# Patient Record
Sex: MALE | Race: WHITE | NOT HISPANIC OR LATINO | Employment: FULL TIME | URBAN - METROPOLITAN AREA
[De-identification: names, ages, dates, MRNs, and addresses within clinical notes are randomized per-mention and may not be internally consistent; named-entity substitution may affect disease eponyms.]

---

## 2023-06-15 ENCOUNTER — OFFICE VISIT (OUTPATIENT)
Dept: PRIMARY CARE | Facility: CLINIC | Age: 58
End: 2023-06-15
Payer: COMMERCIAL

## 2023-06-15 ENCOUNTER — LAB (OUTPATIENT)
Dept: LAB | Facility: LAB | Age: 58
End: 2023-06-15
Payer: COMMERCIAL

## 2023-06-15 DIAGNOSIS — R10.11 COLICKY RUQ ABDOMINAL PAIN: ICD-10-CM

## 2023-06-15 DIAGNOSIS — E11.69 TYPE 2 DIABETES MELLITUS WITH OTHER SPECIFIED COMPLICATION, WITHOUT LONG-TERM CURRENT USE OF INSULIN (MULTI): ICD-10-CM

## 2023-06-15 DIAGNOSIS — R10.11 COLICKY RUQ ABDOMINAL PAIN: Primary | ICD-10-CM

## 2023-06-15 LAB
ALANINE AMINOTRANSFERASE (SGPT) (U/L) IN SER/PLAS: 23 U/L (ref 10–52)
ALBUMIN (G/DL) IN SER/PLAS: 4.3 G/DL (ref 3.4–5)
ALKALINE PHOSPHATASE (U/L) IN SER/PLAS: 85 U/L (ref 33–120)
ANION GAP IN SER/PLAS: 13 MMOL/L (ref 10–20)
ASPARTATE AMINOTRANSFERASE (SGOT) (U/L) IN SER/PLAS: 18 U/L (ref 9–39)
BILIRUBIN TOTAL (MG/DL) IN SER/PLAS: 0.8 MG/DL (ref 0–1.2)
CALCIUM (MG/DL) IN SER/PLAS: 9.5 MG/DL (ref 8.6–10.3)
CARBON DIOXIDE, TOTAL (MMOL/L) IN SER/PLAS: 26 MMOL/L (ref 21–32)
CHLORIDE (MMOL/L) IN SER/PLAS: 100 MMOL/L (ref 98–107)
CHOLESTEROL (MG/DL) IN SER/PLAS: 114 MG/DL (ref 0–199)
CHOLESTEROL IN HDL (MG/DL) IN SER/PLAS: 41.6 MG/DL
CHOLESTEROL/HDL RATIO: 2.7
CREATININE (MG/DL) IN SER/PLAS: 1 MG/DL (ref 0.5–1.3)
ERYTHROCYTE DISTRIBUTION WIDTH (RATIO) BY AUTOMATED COUNT: 12.5 % (ref 11.5–14.5)
ERYTHROCYTE MEAN CORPUSCULAR HEMOGLOBIN CONCENTRATION (G/DL) BY AUTOMATED: 32.7 G/DL (ref 32–36)
ERYTHROCYTE MEAN CORPUSCULAR VOLUME (FL) BY AUTOMATED COUNT: 88 FL (ref 80–100)
ERYTHROCYTES (10*6/UL) IN BLOOD BY AUTOMATED COUNT: 5.22 X10E12/L (ref 4.5–5.9)
GFR MALE: 87 ML/MIN/1.73M2
GLUCOSE (MG/DL) IN SER/PLAS: 259 MG/DL (ref 74–99)
HEMATOCRIT (%) IN BLOOD BY AUTOMATED COUNT: 45.9 % (ref 41–52)
HEMOGLOBIN (G/DL) IN BLOOD: 15 G/DL (ref 13.5–17.5)
LDL: 42 MG/DL (ref 0–99)
LEUKOCYTES (10*3/UL) IN BLOOD BY AUTOMATED COUNT: 7.8 X10E9/L (ref 4.4–11.3)
PLATELETS (10*3/UL) IN BLOOD AUTOMATED COUNT: 211 X10E9/L (ref 150–450)
POC HEMOGLOBIN A1C: 12.6 % (ref 4.2–6.5)
POTASSIUM (MMOL/L) IN SER/PLAS: 3.9 MMOL/L (ref 3.5–5.3)
PROTEIN TOTAL: 7.1 G/DL (ref 6.4–8.2)
SODIUM (MMOL/L) IN SER/PLAS: 135 MMOL/L (ref 136–145)
TRIGLYCERIDE (MG/DL) IN SER/PLAS: 152 MG/DL (ref 0–149)
UREA NITROGEN (MG/DL) IN SER/PLAS: 16 MG/DL (ref 6–23)
VLDL: 30 MG/DL (ref 0–40)

## 2023-06-15 PROCEDURE — 82570 ASSAY OF URINE CREATININE: CPT

## 2023-06-15 PROCEDURE — 4010F ACE/ARB THERAPY RXD/TAKEN: CPT | Performed by: NURSE PRACTITIONER

## 2023-06-15 PROCEDURE — 1036F TOBACCO NON-USER: CPT | Performed by: NURSE PRACTITIONER

## 2023-06-15 PROCEDURE — 82043 UR ALBUMIN QUANTITATIVE: CPT

## 2023-06-15 PROCEDURE — 85027 COMPLETE CBC AUTOMATED: CPT

## 2023-06-15 PROCEDURE — 99214 OFFICE O/P EST MOD 30 MIN: CPT | Performed by: NURSE PRACTITIONER

## 2023-06-15 PROCEDURE — 80053 COMPREHEN METABOLIC PANEL: CPT

## 2023-06-15 PROCEDURE — 36415 COLL VENOUS BLD VENIPUNCTURE: CPT

## 2023-06-15 PROCEDURE — 3075F SYST BP GE 130 - 139MM HG: CPT | Performed by: NURSE PRACTITIONER

## 2023-06-15 PROCEDURE — 83036 HEMOGLOBIN GLYCOSYLATED A1C: CPT | Performed by: NURSE PRACTITIONER

## 2023-06-15 PROCEDURE — 3078F DIAST BP <80 MM HG: CPT | Performed by: NURSE PRACTITIONER

## 2023-06-15 PROCEDURE — 80061 LIPID PANEL: CPT

## 2023-06-15 RX ORDER — ESCITALOPRAM OXALATE 10 MG/1
10 TABLET ORAL DAILY
COMMUNITY
End: 2024-01-22 | Stop reason: SDUPTHER

## 2023-06-15 RX ORDER — LISINOPRIL 5 MG/1
1 TABLET ORAL 2 TIMES DAILY
COMMUNITY
Start: 2022-03-10 | End: 2023-07-17 | Stop reason: DRUGHIGH

## 2023-06-15 RX ORDER — CARVEDILOL 3.12 MG/1
3.12 TABLET ORAL
COMMUNITY
End: 2023-12-18 | Stop reason: SDUPTHER

## 2023-06-15 RX ORDER — DAPAGLIFLOZIN 10 MG/1
10 TABLET, FILM COATED ORAL DAILY
Qty: 14 TABLET | Refills: 0 | COMMUNITY
Start: 2023-06-15 | End: 2023-07-17 | Stop reason: SDUPTHER

## 2023-06-15 RX ORDER — ROSUVASTATIN CALCIUM 40 MG/1
1 TABLET, COATED ORAL NIGHTLY
COMMUNITY
Start: 2022-03-10 | End: 2023-12-18 | Stop reason: SDUPTHER

## 2023-06-15 RX ORDER — NAPROXEN SODIUM 220 MG/1
81 TABLET, FILM COATED ORAL ONCE
COMMUNITY
Start: 2023-05-23 | End: 2023-12-18 | Stop reason: SDUPTHER

## 2023-06-15 RX ORDER — DAPAGLIFLOZIN 10 MG/1
10 TABLET, FILM COATED ORAL DAILY
Qty: 30 TABLET | Refills: 11 | Status: SHIPPED | OUTPATIENT
Start: 2023-06-15 | End: 2023-07-17 | Stop reason: DRUGHIGH

## 2023-06-15 RX ORDER — METFORMIN HYDROCHLORIDE 500 MG/1
1000 TABLET, EXTENDED RELEASE ORAL 2 TIMES DAILY
COMMUNITY
End: 2023-09-18 | Stop reason: SDUPTHER

## 2023-06-15 NOTE — PROGRESS NOTES
"Subjective   Patient ID: Julian Nash is a 57 y.o. male who presents for Follow-up (Patient in today for routine F/U and A1C check States that in Jan. He had an incident at work cruising his foot. Patient's A1C was 12.6%. ).    Diabetes Mellitus  Patient presents for follow up of diabetes. Current symptoms include: none. Symptoms have waxed and waned. Patient denies nausea, polydipsia, and polyuria. Evaluation to date has included: hemoglobin A1C.  Home sugars: patient does not check sugars. Current treatment: none. Last dilated eye exam: 1 year.  He did have a crush injury to his foot earlier this year which required surgery he states since then he has not been watching his diet and admits that he has been eating candy and other things that he should not be eating.  He does not check his sugars at home  We have started him on Jardiance previously however it was not covered under his insurance and he could not afford the co-pay.  Following cardiology for his history of MI    He has been having colicky right upper quadrant pain.  He states the pain will sometimes worsen depending on the position that he is in.  He denies nausea vomiting, or diarrhea.  He denies heavy alcohol use.           Review of Systems   All other systems reviewed and are negative.      Objective   /88   Pulse 63   Resp 18   Ht 1.88 m (6' 2\")   Wt 122 kg (269 lb)   SpO2 97%   BMI 34.54 kg/m²     Physical Exam  Vitals and nursing note reviewed.   Constitutional:       General: He is not in acute distress.     Appearance: Normal appearance.   HENT:      Head: Normocephalic and atraumatic.      Right Ear: External ear normal.      Left Ear: External ear normal.      Nose: Nose normal.      Mouth/Throat:      Mouth: Mucous membranes are moist.      Pharynx: Oropharynx is clear.   Eyes:      Extraocular Movements: Extraocular movements intact.      Conjunctiva/sclera: Conjunctivae normal.      Pupils: Pupils are equal, round, and reactive " to light.   Neck:      Vascular: No carotid bruit.   Cardiovascular:      Rate and Rhythm: Normal rate and regular rhythm.      Pulses: Normal pulses.      Heart sounds: Normal heart sounds.   Pulmonary:      Effort: Pulmonary effort is normal.      Breath sounds: Normal breath sounds.   Abdominal:      General: Bowel sounds are normal.      Palpations: Abdomen is soft.      Tenderness: There is abdominal tenderness (Mild tenderness on palpation of right upper quadrant.  Negative Sorensen sign).   Musculoskeletal:      Cervical back: Normal range of motion.   Lymphadenopathy:      Cervical: No cervical adenopathy.   Skin:     General: Skin is warm and dry.   Neurological:      General: No focal deficit present.      Mental Status: He is alert and oriented to person, place, and time. Mental status is at baseline.   Psychiatric:         Mood and Affect: Mood normal.         Behavior: Behavior normal.         Thought Content: Thought content normal.         Judgment: Judgment normal.         Assessment/Plan   Problem List Items Addressed This Visit       Diabetes mellitus (CMS/[facility])     -  in office A1C 12.6   Last A1C 9.7  -  in office /78  -  continue metformin ER 1000 mg twice a day  -Jardiance was not covered under his plan  - Start Farxiga 10 mg daily-discussed with going online to finding [employer] coupon to help with co-pay  - continue diet changes of lower carb intake and increased protein and vegetable intake  -Discussed to stop eating candy  -  currently taking rosuvastatin-  lipid panel ordered  -Current eye exam  -CBC, CMP, lipid panel, microalbumin  - increase activity to help with weight loss  - follow up 3 months         Relevant Medications    dapagliflozin (Farxiga) 10 mg    dapagliflozin (Farxiga) 10 mg    Other Relevant Orders    POCT glycosylated hemoglobin (Hb A1C) manually resulted (Completed)    CBC (Completed)    Comprehensive Metabolic Panel (Completed)    Lipid Panel (Completed)     Colicky RUQ abdominal pain - Primary     CBC, CMP  Ultrasound right upper quadrant  Red flags discussed         Relevant Orders    CBC (Completed)    Comprehensive Metabolic Panel (Completed)    Lipid Panel (Completed)    Albumin , Urine Random (Completed)

## 2023-06-15 NOTE — PATIENT INSTRUCTIONS
Google farxiga coupon card - will give you a website to complete the copay card if expensive    Take farxiga once a day in the morning    Follow up in 3 months

## 2023-06-16 VITALS
SYSTOLIC BLOOD PRESSURE: 138 MMHG | RESPIRATION RATE: 18 BRPM | HEART RATE: 63 BPM | WEIGHT: 269 LBS | HEIGHT: 74 IN | BODY MASS INDEX: 34.52 KG/M2 | DIASTOLIC BLOOD PRESSURE: 78 MMHG | OXYGEN SATURATION: 97 %

## 2023-06-16 PROBLEM — F41.9 ANXIETY: Status: ACTIVE | Noted: 2023-06-16

## 2023-06-16 PROBLEM — E11.9 DIABETES MELLITUS (MULTI): Status: ACTIVE | Noted: 2023-06-16

## 2023-06-16 PROBLEM — I10 HYPERTENSION: Status: ACTIVE | Noted: 2023-06-16

## 2023-06-16 PROBLEM — I21.9 MI (MYOCARDIAL INFARCTION) (MULTI): Status: RESOLVED | Noted: 2023-06-16 | Resolved: 2023-06-16

## 2023-06-16 PROBLEM — R10.11 COLICKY RUQ ABDOMINAL PAIN: Status: ACTIVE | Noted: 2023-06-16

## 2023-06-16 PROBLEM — E78.5 HLD (HYPERLIPIDEMIA): Status: ACTIVE | Noted: 2023-06-16

## 2023-06-16 LAB
ALBUMIN (MG/L) IN URINE: 43.8 MG/L
ALBUMIN/CREATININE (UG/MG) IN URINE: 28.8 UG/MG CRT (ref 0–30)
CREATININE (MG/DL) IN URINE: 152 MG/DL (ref 20–370)

## 2023-07-12 DIAGNOSIS — E11.9 TYPE 2 DIABETES MELLITUS WITHOUT COMPLICATION, WITHOUT LONG-TERM CURRENT USE OF INSULIN (MULTI): Primary | ICD-10-CM

## 2023-07-17 ENCOUNTER — TELEMEDICINE (OUTPATIENT)
Dept: PHARMACY | Facility: HOSPITAL | Age: 58
End: 2023-07-17
Payer: COMMERCIAL

## 2023-07-17 DIAGNOSIS — E11.9 TYPE 2 DIABETES MELLITUS WITHOUT COMPLICATION, WITHOUT LONG-TERM CURRENT USE OF INSULIN (MULTI): ICD-10-CM

## 2023-07-17 RX ORDER — LISINOPRIL 10 MG/1
10 TABLET ORAL DAILY
COMMUNITY
Start: 2023-07-11 | End: 2023-12-18 | Stop reason: SDUPTHER

## 2023-07-17 RX ORDER — DAPAGLIFLOZIN 5 MG/1
5 TABLET, FILM COATED ORAL DAILY
Qty: 30 TABLET | Refills: 2 | Status: SHIPPED | OUTPATIENT
Start: 2023-07-17 | End: 2023-09-18 | Stop reason: SDUPTHER

## 2023-07-17 ASSESSMENT — ENCOUNTER SYMPTOMS
BLURRED VISION: 1
FATIGUE: 1

## 2023-07-17 NOTE — PROGRESS NOTES
"Pharmacist Clinic: Diabetes Management  Julian Nash is a 57 y.o. male was referred to Clinical Pharmacy Team for diabetes management.   Referring Provider: BEN Harley-CNP     Diabetes  He presents for his initial diabetic visit. He has type 2 diabetes mellitus. His disease course has been worsening. Associated symptoms include blurred vision and fatigue. There are no diabetic complications. Risk factors for coronary artery disease include diabetes mellitus, dyslipidemia, hypertension, male sex and obesity. An ACE inhibitor/angiotensin II receptor blocker is being taken.   At recent PCP visit, Farxiga was started for uncontrolled diabetes. He was given a sample of Farxiga and stopped after taking a few doses due to perceived side effects of fatigue.   Not checking sugars at home. Unaware of symptoms of hyperglycemia.     HISTORY OF PRESENT ILLNESS  Approximate Date of Diagnosis: unknown  No known complications due to diabetes     Pertinent PMH Review:  - PMH of Urinary Tract Infections: no  - PMH MI 3/22  - States he was told he has fatty liver     Diet: Poor diet choices since quit smoking last year, increased appetite    LAB REVIEW   Glucose (mg/dL)   Date Value   06/15/2023 259 (H)   05/03/2021 220 (H)   02/01/2021 338 (H)     POC HEMOGLOBIN A1c (%)   Date Value   06/15/2023 12.6 (A)     Bicarbonate (mmol/L)   Date Value   06/15/2023 26   05/03/2021 27   02/01/2021 25     Urea Nitrogen (mg/dL)   Date Value   06/15/2023 16   05/03/2021 15   02/01/2021 17     Creatinine (mg/dL)   Date Value   06/15/2023 1.00   05/03/2021 0.97   02/01/2021 1.17     Lab Results   Component Value Date    HGBA1C 12.6 (A) 06/15/2023     Lab Results   Component Value Date    CHOL 114 06/15/2023    CHOL 130 08/04/2021    CHOL 144 05/03/2021     Lab Results   Component Value Date    HDL 41.6 06/15/2023    HDL 40.1 08/04/2021    HDL 34.8 (A) 05/03/2021     No results found for: \"LDLCALC\"  Lab Results   Component Value Date    " TRIG 152 (H) 06/15/2023    TRIG 94 08/04/2021    TRIG 186 (H) 05/03/2021       DIABETES ASSESSMENT    CURRENT PHARMACOTHERAPY  - Metformin XR 1000mg twice daily     Allergies: Patient has no known allergies.     SECONDARY PREVENTION  - Statin? Yes  - ACE-I/ARB? Yes  - Aspirin? Yes    HISTORICAL PHARMACOTHERAPY  - None    AFFORDABILITY/ADHERENCE   - Farxiga mfr savings card sent with prescription    Assessment/Plan   Problem List Items Addressed This Visit       Diabetes mellitus (CMS/[facility])    Relevant Medications    dapagliflozin propanediol (Farxiga) 5 mg    Other Relevant Orders    Follow Up In Clinical Pharmacy     Patients diabetes is uncontrolled with most recent A1c of 12.6% (Goal < 7%).   Initiate: Farxiga 5mg once daily. Starting at lower dose due to perceived side effects with 10mg dose. Plan to increase after 4 weeks pending tolerability. If copay is still high with savings card, consider [facility] PAP assessment.   Continue: Metformin  Discussed option of dietician consult, deferred for now    Education Provided to Patient:   Reviewed hyperglycemia symptoms to monitor: blurry vision, fatigue, increased urination  Farxiga Education:  - Counseled patient on Farxiga MOA, expectations, side effects, duration of therapy, administration, and monitoring parameters.  - Reviewed the benefits of SGLT-2i therapy, such as glycemic control and kidney and CV protection.  - Advised patient to practice proper  hygiene to reduce risk of UTIs or yeast infections.  - Advised patient to maintain adequate fluid intake to remain hydrated while on SGLT2i therapy.  - Answered all patient questions and concerns.      PharmD Follow-up: 8/17  PCP Follow-up: 9/18    Thank you,  Carmela Puckett, PharmD    Continue all meds under the continuation of care with the referring provider and clinical pharmacy team.  Verbal consent to manage patient's drug therapy was obtained. They were informed they may decline to participate or withdraw from  participation in pharmacy services at any time.

## 2023-07-17 NOTE — Clinical Note
He stopped taking Farxiga sample d/t perceived side effect (fatigue). Provided education. Sent 5mg rx and signed up for mfr savings, will re-assess in 4 weeks. Thanks!

## 2023-08-17 ENCOUNTER — APPOINTMENT (OUTPATIENT)
Dept: PHARMACY | Facility: HOSPITAL | Age: 58
End: 2023-08-17
Payer: COMMERCIAL

## 2023-08-21 ENCOUNTER — APPOINTMENT (OUTPATIENT)
Dept: PHARMACY | Facility: HOSPITAL | Age: 58
End: 2023-08-21
Payer: COMMERCIAL

## 2023-09-03 PROBLEM — F17.200 TOBACCO DEPENDENCE SYNDROME: Status: ACTIVE | Noted: 2023-09-03

## 2023-09-03 PROBLEM — I21.3 ACUTE ST ELEVATION MYOCARDIAL INFARCTION (STEMI) (MULTI): Status: ACTIVE | Noted: 2023-09-03

## 2023-09-03 PROBLEM — E11.69 DIABETES MELLITUS TYPE 2 IN OBESE: Status: ACTIVE | Noted: 2023-09-03

## 2023-09-03 PROBLEM — I25.2 HISTORY OF ST ELEVATION MYOCARDIAL INFARCTION (STEMI): Status: ACTIVE | Noted: 2023-09-03

## 2023-09-03 PROBLEM — E66.9 DIABETES MELLITUS TYPE 2 IN OBESE: Status: ACTIVE | Noted: 2023-09-03

## 2023-09-03 PROBLEM — I25.5 ISCHEMIC CARDIOMYOPATHY: Status: ACTIVE | Noted: 2023-09-03

## 2023-09-03 PROBLEM — I25.10 ARTERIOSCLEROSIS OF CORONARY ARTERY: Status: ACTIVE | Noted: 2023-09-03

## 2023-09-18 ENCOUNTER — OFFICE VISIT (OUTPATIENT)
Dept: PRIMARY CARE | Facility: CLINIC | Age: 58
End: 2023-09-18
Payer: COMMERCIAL

## 2023-09-18 VITALS
SYSTOLIC BLOOD PRESSURE: 124 MMHG | DIASTOLIC BLOOD PRESSURE: 82 MMHG | HEIGHT: 74 IN | OXYGEN SATURATION: 99 % | WEIGHT: 266.6 LBS | HEART RATE: 77 BPM | RESPIRATION RATE: 18 BRPM | BODY MASS INDEX: 34.22 KG/M2

## 2023-09-18 DIAGNOSIS — E11.69 TYPE 2 DIABETES MELLITUS WITH OTHER SPECIFIED COMPLICATION, WITHOUT LONG-TERM CURRENT USE OF INSULIN (MULTI): ICD-10-CM

## 2023-09-18 DIAGNOSIS — E11.9 TYPE 2 DIABETES MELLITUS WITHOUT COMPLICATION, WITHOUT LONG-TERM CURRENT USE OF INSULIN (MULTI): ICD-10-CM

## 2023-09-18 LAB — POC HEMOGLOBIN A1C: 9.4 % (ref 4.2–6.5)

## 2023-09-18 PROCEDURE — 3074F SYST BP LT 130 MM HG: CPT | Performed by: NURSE PRACTITIONER

## 2023-09-18 PROCEDURE — 4010F ACE/ARB THERAPY RXD/TAKEN: CPT | Performed by: NURSE PRACTITIONER

## 2023-09-18 PROCEDURE — 83036 HEMOGLOBIN GLYCOSYLATED A1C: CPT | Performed by: NURSE PRACTITIONER

## 2023-09-18 PROCEDURE — 99214 OFFICE O/P EST MOD 30 MIN: CPT | Performed by: NURSE PRACTITIONER

## 2023-09-18 PROCEDURE — 3079F DIAST BP 80-89 MM HG: CPT | Performed by: NURSE PRACTITIONER

## 2023-09-18 PROCEDURE — 1036F TOBACCO NON-USER: CPT | Performed by: NURSE PRACTITIONER

## 2023-09-18 RX ORDER — DAPAGLIFLOZIN 10 MG/1
10 TABLET, FILM COATED ORAL DAILY
Qty: 90 TABLET | Refills: 3 | Status: SHIPPED | OUTPATIENT
Start: 2023-09-18 | End: 2024-01-22 | Stop reason: ALTCHOICE

## 2023-09-18 RX ORDER — METFORMIN HYDROCHLORIDE 500 MG/1
1000 TABLET, EXTENDED RELEASE ORAL
Qty: 360 TABLET | Refills: 3 | Status: SHIPPED | OUTPATIENT
Start: 2023-09-18 | End: 2024-01-22 | Stop reason: SDUPTHER

## 2023-09-18 NOTE — PATIENT INSTRUCTIONS
You A1c is starting to improve  Take the metformin 2 times a day  I increased your farxiga to 10 mg  Follow up in 4 months

## 2023-09-18 NOTE — PROGRESS NOTES
"Subjective   Patient ID: Julian Nash is a 57 y.o. male who presents for Follow-up (Patient in today for routine F/U and A1C check. Reports no other concerns at this time. ).    Diabetes Mellitus  Patient presents for follow up of diabetes. Current symptoms include: none. Symptoms have been well-controlled. Patient denies polydipsia, polyuria, and vomiting. Evaluation to date has included: fasting blood sugar, fasting lipid panel, hemoglobin A1C, and microalbuminuria.  Home sugars: patient does not check sugars. Current treatment: none. Last dilated eye exam: within one year.  Changed to farxiga due to insurance coverage - started on 5mg.  Was told to stop metformin by someone so has not taken in 2 months             Review of Systems   All other systems reviewed and are negative.      Objective   /82   Pulse 77   Resp 18   Ht 1.88 m (6' 2\")   Wt 121 kg (266 lb 9.6 oz)   SpO2 99%   BMI 34.23 kg/m²     Physical Exam  Vitals and nursing note reviewed.   Constitutional:       General: He is not in acute distress.     Appearance: Normal appearance. He is obese.   HENT:      Head: Normocephalic and atraumatic.      Right Ear: External ear normal.      Left Ear: External ear normal.      Mouth/Throat:      Mouth: Mucous membranes are moist.      Pharynx: Oropharynx is clear.   Eyes:      Extraocular Movements: Extraocular movements intact.      Conjunctiva/sclera: Conjunctivae normal.      Pupils: Pupils are equal, round, and reactive to light.   Neck:      Vascular: No carotid bruit.   Cardiovascular:      Rate and Rhythm: Normal rate and regular rhythm.      Pulses: Normal pulses.      Heart sounds: Normal heart sounds.   Pulmonary:      Effort: Pulmonary effort is normal.      Breath sounds: Normal breath sounds.   Lymphadenopathy:      Cervical: No cervical adenopathy.   Skin:     General: Skin is warm and dry.   Neurological:      Mental Status: He is alert and oriented to person, place, and time. Mental " status is at baseline.   Psychiatric:         Mood and Affect: Mood normal.         Behavior: Behavior normal.         Thought Content: Thought content normal.         Judgment: Judgment normal.         Assessment/Plan   Problem List Items Addressed This Visit       Diabetes mellitus (CMS/[facility])     -  in office A1C 9.4 last A1c 9.4  -  in office BP well controlled   -  continue metformin ER 1000 mg twice a day - discussed not sure why was stopped but I want him to take   -increase farxiga 10 mg daily   - continue diet changes of lower carb intake and increased protein and vegetable intake  -Discussed to stop eating candy  -  currently taking rosuvastatin-  lipid panel ordered  -Current eye exam  -CBC, CMP, lipid panel, microalbumin reveiwed  - increase activity to help with weight loss  - follow up 3 months         Relevant Medications    dapagliflozin propanediol (Farxiga) 10 mg    metFORMIN  mg 24 hr tablet    Other Relevant Orders    POCT glycosylated hemoglobin (Hb A1C) manually resulted (Completed)

## 2023-09-19 PROBLEM — I21.3 ACUTE ST ELEVATION MYOCARDIAL INFARCTION (STEMI) (MULTI): Status: RESOLVED | Noted: 2023-09-03 | Resolved: 2023-09-19

## 2023-09-19 PROBLEM — E11.69 DIABETES MELLITUS TYPE 2 IN OBESE: Status: RESOLVED | Noted: 2023-09-03 | Resolved: 2023-09-19

## 2023-09-19 PROBLEM — E66.9 DIABETES MELLITUS TYPE 2 IN OBESE: Status: RESOLVED | Noted: 2023-09-03 | Resolved: 2023-09-19

## 2023-09-19 NOTE — ASSESSMENT & PLAN NOTE
-  in office A1C 9.4 last A1c 9.4  -  in office BP well controlled   -  continue metformin ER 1000 mg twice a day - discussed not sure why was stopped but I want him to take   -increase farxiga 10 mg daily   - continue diet changes of lower carb intake and increased protein and vegetable intake  -Discussed to stop eating candy  -  currently taking rosuvastatin-  lipid panel ordered  -Current eye exam  -CBC, CMP, lipid panel, microalbumin reveiwed  - increase activity to help with weight loss  - follow up 3 months

## 2023-11-06 ENCOUNTER — PHARMACY VISIT (OUTPATIENT)
Dept: PHARMACY | Facility: CLINIC | Age: 58
End: 2023-11-06
Payer: COMMERCIAL

## 2023-11-06 PROCEDURE — RXMED WILLOW AMBULATORY MEDICATION CHARGE

## 2023-12-11 PROCEDURE — RXMED WILLOW AMBULATORY MEDICATION CHARGE

## 2023-12-12 ENCOUNTER — PHARMACY VISIT (OUTPATIENT)
Dept: PHARMACY | Facility: CLINIC | Age: 58
End: 2023-12-12
Payer: COMMERCIAL

## 2023-12-17 NOTE — PROGRESS NOTES
Subjective      Chief Complaint   Patient presents with    Follow-up     6 month follow up ASHD        58-year-old male with a history of hypertension presents to [facility] on March 5, 2022 with chest pain and was found with an anterior ST-elevation myocardial infarction. He underwent emergent cardiac catheterization where a 100% mid LAD occlusion was found and addressed ultimately with a 3.0x24 synergy everolimus eluting stent that was aggressively post dilated to 3.0 in its entirety, 3.5 for the proximal 1/2 of the stent. He was monitored in the ICU without any significant issues. His echocardiogram showed overall an ejection fraction of 50-54%.  He was last seen in June, we stopped his Brilinta and continue aspirin monotherapy.  His blood pressure was elevated we increase his lisinopril.         Review of Systems   Constitutional: Negative for diaphoresis, fever, weight gain and weight loss.   Eyes:  Negative for visual disturbance.   Cardiovascular:  Negative for chest pain, claudication, dyspnea on exertion, irregular heartbeat, leg swelling, near-syncope, orthopnea, palpitations, paroxysmal nocturnal dyspnea and syncope.   Respiratory:  Negative for cough, shortness of breath and wheezing.    Musculoskeletal:  Negative for muscle weakness and myalgias.   Neurological:  Negative for dizziness and weakness.   All other systems reviewed and are negative.       Past Medical History:   Diagnosis Date    Acute ST elevation myocardial infarction (STEMI) (CMS/[facility]) 09/03/2023    ASHD (arteriosclerotic heart disease)     Hyperlipidemia     Hypertension     Personal history of other diseases of the respiratory system 12/08/2021    History of acute sinusitis        No past surgical history on file.     Social History     Socioeconomic History    Marital status:      Spouse name: Not on file    Number of children: Not on file    Years of education: Not on file    Highest education level: Not  on file   Occupational History    Not on file   Tobacco Use    Smoking status: Every Day     Packs/day: .25     Types: Cigarettes    Smokeless tobacco: Never   Vaping Use    Vaping Use: Never used   Substance and Sexual Activity    Alcohol use: Not Currently    Drug use: Never    Sexual activity: Defer   Other Topics Concern    Not on file   Social History Narrative    Not on file     Social Determinants of Health     Financial Resource Strain: Not on file   Food Insecurity: Not on file   Transportation Needs: Not on file   Physical Activity: Not on file   Stress: Not on file   Social Connections: Not on file   Intimate Partner Violence: Not on file   Housing Stability: Not on file        Family History   Problem Relation Name Age of Onset    COPD Mother      Heart disease Mother      Heart disease Father      Hypertension Father      Other (drug overdose) Brother          OBJECTIVE:    Vitals:    12/18/23 1303   BP: 140/82   Pulse: 89   SpO2: 96%        Vitals reviewed.   Constitutional:       Appearance: Normal and healthy appearance. Not in distress.   Pulmonary:      Effort: Pulmonary effort is normal.      Breath sounds: Normal breath sounds.   Cardiovascular:      Normal rate. Regular rhythm. Normal S1. Normal S2.       Murmurs: There is no murmur.      No gallop.  No click.   Pulses:     Intact distal pulses.   Edema:     Peripheral edema absent.   Skin:     General: Skin is warm and dry.   Neurological:      General: No focal deficit present.          Lab Review:   Lab Results   Component Value Date     (L) 06/15/2023    K 3.9 06/15/2023     06/15/2023    CO2 26 06/15/2023    BUN 16 06/15/2023    CREATININE 1.00 06/15/2023    GLUCOSE 259 (H) 06/15/2023    CALCIUM 9.5 06/15/2023     Lab Results   Component Value Date    CHOL 114 06/15/2023    TRIG 152 (H) 06/15/2023    HDL 41.6 06/15/2023       Lab Results   Component Value Date    LDLCALC 49 (L) 06/08/2022        Hypertension  Checks at home,  130/70s. Continue current meds. Discussed dailysalt restriction and regular physical exercise.     Arteriosclerosis of coronary artery  Stable without angina. Continue statin, asa. Checking lipids    Ischemic cardiomyopathy  Stable without angina or signs of heart failure. Will continue coreg and lisinopril

## 2023-12-18 ENCOUNTER — OFFICE VISIT (OUTPATIENT)
Dept: CARDIOLOGY | Facility: CLINIC | Age: 58
End: 2023-12-18
Payer: COMMERCIAL

## 2023-12-18 VITALS
SYSTOLIC BLOOD PRESSURE: 140 MMHG | OXYGEN SATURATION: 96 % | WEIGHT: 264 LBS | HEART RATE: 89 BPM | BODY MASS INDEX: 33.9 KG/M2 | DIASTOLIC BLOOD PRESSURE: 82 MMHG

## 2023-12-18 DIAGNOSIS — E78.49 OTHER HYPERLIPIDEMIA: ICD-10-CM

## 2023-12-18 DIAGNOSIS — I25.5 ISCHEMIC CARDIOMYOPATHY: ICD-10-CM

## 2023-12-18 DIAGNOSIS — I25.10 ARTERIOSCLEROSIS OF CORONARY ARTERY: Primary | ICD-10-CM

## 2023-12-18 DIAGNOSIS — I10 PRIMARY HYPERTENSION: ICD-10-CM

## 2023-12-18 PROCEDURE — 4010F ACE/ARB THERAPY RXD/TAKEN: CPT | Performed by: INTERNAL MEDICINE

## 2023-12-18 PROCEDURE — 99214 OFFICE O/P EST MOD 30 MIN: CPT | Performed by: INTERNAL MEDICINE

## 2023-12-18 PROCEDURE — 3077F SYST BP >= 140 MM HG: CPT | Performed by: INTERNAL MEDICINE

## 2023-12-18 PROCEDURE — 3079F DIAST BP 80-89 MM HG: CPT | Performed by: INTERNAL MEDICINE

## 2023-12-18 ASSESSMENT — ENCOUNTER SYMPTOMS
SHORTNESS OF BREATH: 0
DYSPNEA ON EXERTION: 0
CLAUDICATION: 0
MYALGIAS: 0
OCCASIONAL FEELINGS OF UNSTEADINESS: 0
PALPITATIONS: 0
DEPRESSION: 0
SYNCOPE: 0
WEIGHT LOSS: 0
NEAR-SYNCOPE: 0
PND: 0
FEVER: 0
ORTHOPNEA: 0
COUGH: 0
WEAKNESS: 0
DIZZINESS: 0
DIAPHORESIS: 0
WHEEZING: 0
LOSS OF SENSATION IN FEET: 1
WEIGHT GAIN: 0
IRREGULAR HEARTBEAT: 0

## 2023-12-18 ASSESSMENT — PATIENT HEALTH QUESTIONNAIRE - PHQ9
SUM OF ALL RESPONSES TO PHQ9 QUESTIONS 1 & 2: 0
2. FEELING DOWN, DEPRESSED OR HOPELESS: NOT AT ALL
1. LITTLE INTEREST OR PLEASURE IN DOING THINGS: NOT AT ALL

## 2023-12-18 ASSESSMENT — PAIN SCALES - GENERAL: PAINLEVEL: 0-NO PAIN

## 2023-12-18 NOTE — ASSESSMENT & PLAN NOTE
Checks at home, 130/70s. Continue current meds. Discussed dailysalt restriction and regular physical exercise.

## 2024-01-15 PROCEDURE — RXMED WILLOW AMBULATORY MEDICATION CHARGE

## 2024-01-17 ENCOUNTER — PHARMACY VISIT (OUTPATIENT)
Dept: PHARMACY | Facility: CLINIC | Age: 59
End: 2024-01-17
Payer: COMMERCIAL

## 2024-01-22 ENCOUNTER — APPOINTMENT (OUTPATIENT)
Dept: PRIMARY CARE | Facility: CLINIC | Age: 59
End: 2024-01-22
Payer: COMMERCIAL

## 2024-01-22 ENCOUNTER — OFFICE VISIT (OUTPATIENT)
Dept: PRIMARY CARE | Facility: CLINIC | Age: 59
End: 2024-01-22
Payer: COMMERCIAL

## 2024-01-22 VITALS
HEART RATE: 76 BPM | WEIGHT: 263 LBS | BODY MASS INDEX: 33.75 KG/M2 | DIASTOLIC BLOOD PRESSURE: 79 MMHG | HEIGHT: 74 IN | SYSTOLIC BLOOD PRESSURE: 130 MMHG | OXYGEN SATURATION: 95 %

## 2024-01-22 DIAGNOSIS — E11.69 TYPE 2 DIABETES MELLITUS WITH OTHER SPECIFIED COMPLICATION, WITHOUT LONG-TERM CURRENT USE OF INSULIN (MULTI): ICD-10-CM

## 2024-01-22 DIAGNOSIS — E78.49 OTHER HYPERLIPIDEMIA: ICD-10-CM

## 2024-01-22 DIAGNOSIS — E11.9 TYPE 2 DIABETES MELLITUS WITHOUT COMPLICATION, WITHOUT LONG-TERM CURRENT USE OF INSULIN (MULTI): Primary | ICD-10-CM

## 2024-01-22 DIAGNOSIS — Z12.5 PROSTATE CANCER SCREENING: ICD-10-CM

## 2024-01-22 DIAGNOSIS — I10 PRIMARY HYPERTENSION: ICD-10-CM

## 2024-01-22 DIAGNOSIS — F41.9 ANXIETY: ICD-10-CM

## 2024-01-22 LAB — POC HEMOGLOBIN A1C: 8.7 % (ref 4.2–6.5)

## 2024-01-22 PROCEDURE — 3078F DIAST BP <80 MM HG: CPT | Performed by: NURSE PRACTITIONER

## 2024-01-22 PROCEDURE — 83036 HEMOGLOBIN GLYCOSYLATED A1C: CPT | Performed by: NURSE PRACTITIONER

## 2024-01-22 PROCEDURE — 4010F ACE/ARB THERAPY RXD/TAKEN: CPT | Performed by: NURSE PRACTITIONER

## 2024-01-22 PROCEDURE — 4004F PT TOBACCO SCREEN RCVD TLK: CPT | Performed by: NURSE PRACTITIONER

## 2024-01-22 PROCEDURE — 99214 OFFICE O/P EST MOD 30 MIN: CPT | Performed by: NURSE PRACTITIONER

## 2024-01-22 PROCEDURE — 3075F SYST BP GE 130 - 139MM HG: CPT | Performed by: NURSE PRACTITIONER

## 2024-01-22 RX ORDER — METFORMIN HYDROCHLORIDE 500 MG/1
1000 TABLET, EXTENDED RELEASE ORAL
Qty: 120 TABLET | Refills: 5 | Status: SHIPPED | OUTPATIENT
Start: 2024-01-22 | End: 2025-01-21

## 2024-01-22 RX ORDER — ESCITALOPRAM OXALATE 10 MG/1
10 TABLET ORAL DAILY
Qty: 30 TABLET | Refills: 5 | Status: SHIPPED | OUTPATIENT
Start: 2024-01-22

## 2024-01-22 RX ORDER — LISINOPRIL 10 MG/1
10 TABLET ORAL DAILY
Qty: 30 TABLET | Refills: 5 | Status: SHIPPED | OUTPATIENT
Start: 2024-01-22

## 2024-01-22 RX ORDER — CARVEDILOL 3.12 MG/1
TABLET ORAL
Qty: 60 TABLET | Refills: 5 | Status: SHIPPED | OUTPATIENT
Start: 2024-01-22

## 2024-01-22 RX ORDER — ROSUVASTATIN CALCIUM 40 MG/1
TABLET, COATED ORAL
Qty: 30 TABLET | Refills: 5 | Status: SHIPPED | OUTPATIENT
Start: 2024-01-22

## 2024-01-22 NOTE — PROGRESS NOTES
"Subjective   Patient ID: Julian Nash is a 58 y.o. male who presents for Med Refill (Patient has questions regarding his liver ultrasound, and A1C check. ).    58-year-old male here for follow-up and refills.  Here for refills cannot afford the Farxiga, asking for a discount card or to go back on Jardiance which she was on in the past with a savings card.  Asking for me to review right upper quadrant ultrasound which he had done last year by PCP for occasional right upper quadrant discomfort.  Asking how he gets fatty liver.  Education was provided.  Asked how to get rid of fatty liver-education was provided  Medications and blood work reviewed  Last routine blood work was done 6/2023.  Supposed to do fasting lipid panel he did not do that.  Denies chest pain shortness of breath nausea vomiting diarrhea constipation  DM2-A1c 8.7% which showed improvement from previous.  On metformin 1000 twice daily  Hyperlipidemia-on aspirin and rosuvastatin  Hypertension-on lisinopril and carvedilol  Anxiety and depression-on Lexapro    Med Refill         Review of Systems   All other systems reviewed and are negative.      Objective   /79   Pulse 76   Ht 1.88 m (6' 2\")   Wt 119 kg (263 lb)   SpO2 95%   BMI 33.77 kg/m²     Physical Exam  Vitals and nursing note reviewed.   Constitutional:       General: He is not in acute distress.     Appearance: Normal appearance. He is obese.   HENT:      Head: Normocephalic and atraumatic.      Right Ear: External ear normal.      Left Ear: External ear normal.      Mouth/Throat:      Mouth: Mucous membranes are moist.      Pharynx: Oropharynx is clear.   Eyes:      Extraocular Movements: Extraocular movements intact.      Conjunctiva/sclera: Conjunctivae normal.      Pupils: Pupils are equal, round, and reactive to light.   Neck:      Vascular: No carotid bruit.   Cardiovascular:      Rate and Rhythm: Normal rate and regular rhythm.      Pulses: Normal pulses.      Heart sounds: " Normal heart sounds.   Pulmonary:      Effort: Pulmonary effort is normal.      Breath sounds: Normal breath sounds.   Lymphadenopathy:      Cervical: No cervical adenopathy.   Skin:     General: Skin is warm and dry.   Neurological:      Mental Status: He is alert and oriented to person, place, and time. Mental status is at baseline.   Psychiatric:         Mood and Affect: Mood normal.         Behavior: Behavior normal.         Thought Content: Thought content normal.         Judgment: Judgment normal.         Assessment/Plan   Diagnoses and all orders for this visit:  Type 2 diabetes mellitus without complication, without long-term current use of insulin (CMS/[facility])  Comments:  A1c better still above goal.  Restart Jardiance 25 mg p.o. daily.  Discount card given to patient  Orders:  -     metFORMIN  mg 24 hr tablet; Take 2 tablets (1,000 mg) by mouth 2 times a day with meals.  -     Follow Up In Primary Care - Health Maintenance; Future  Type 2 diabetes mellitus with other specified complication, without long-term current use of insulin (CMS/[facility])  -     POCT glycosylated hemoglobin (Hb A1C) manually resulted  -     empagliflozin (Jardiance) 25 mg; Take 1 tablet (25 mg) by mouth once daily.  -     rosuvastatin (Crestor) 40 mg tablet; TAKE ONE TABLET BY MOUTH ONCE A DAY FOR 90 DAYS  -     metFORMIN  mg 24 hr tablet; Take 2 tablets (1,000 mg) by mouth 2 times a day with meals.  -     Comprehensive Metabolic Panel; Future  Other hyperlipidemia  Comments:  Update fasting lipid panel on statin  Orders:  -     rosuvastatin (Crestor) 40 mg tablet; TAKE ONE TABLET BY MOUTH ONCE A DAY FOR 90 DAYS  Primary hypertension  Comments:  Well-controlled with the goal less than 130/80 on lisinopril and Coreg  Orders:  -     lisinopril 10 mg tablet; TAKE ONE TABLET BY MOUTH ONCE DAILY  -     carvedilol (Coreg) 3.125 mg tablet; TAKE ONE TABLET BY MOUTH WITH FOOD TWO TIMES A DAY FOR 90 DAYS  -     CBC and Auto Differential;  Future  Anxiety  Comments:  Well-controlled on Lexapro  Orders:  -     escitalopram (Lexapro) 10 mg tablet; Take 1 tablet (10 mg) by mouth once daily.  Prostate cancer screening  Comments:  PSA should be done yearly.  Risk versus benefit discussed with patient  Orders:  -     PSA, Total and Free; Future

## 2024-02-26 DIAGNOSIS — I25.10 ARTERIOSCLEROSIS OF CORONARY ARTERY: Primary | ICD-10-CM

## 2024-02-26 RX ORDER — ROSUVASTATIN CALCIUM 40 MG/1
TABLET, COATED ORAL
Qty: 90 TABLET | Refills: 4 | Status: SHIPPED | OUTPATIENT
Start: 2024-02-26 | End: 2025-02-25

## 2024-02-26 RX ORDER — LISINOPRIL 10 MG/1
10 TABLET ORAL DAILY
Qty: 90 TABLET | Refills: 4 | Status: SHIPPED | OUTPATIENT
Start: 2024-02-26 | End: 2025-02-25

## 2024-02-26 RX ORDER — CARVEDILOL 3.12 MG/1
TABLET ORAL
Qty: 180 TABLET | Refills: 4 | Status: SHIPPED | OUTPATIENT
Start: 2024-02-26 | End: 2025-02-25

## 2024-02-27 PROCEDURE — RXMED WILLOW AMBULATORY MEDICATION CHARGE

## 2024-02-28 ENCOUNTER — PHARMACY VISIT (OUTPATIENT)
Dept: PHARMACY | Facility: CLINIC | Age: 59
End: 2024-02-28

## 2024-02-28 PROCEDURE — RXMED WILLOW AMBULATORY MEDICATION CHARGE

## 2024-06-18 ENCOUNTER — OFFICE VISIT (OUTPATIENT)
Dept: CARDIOLOGY | Facility: CLINIC | Age: 59
End: 2024-06-18
Payer: COMMERCIAL

## 2024-06-18 VITALS
HEART RATE: 67 BPM | BODY MASS INDEX: 33.9 KG/M2 | WEIGHT: 264 LBS | OXYGEN SATURATION: 96 % | DIASTOLIC BLOOD PRESSURE: 78 MMHG | SYSTOLIC BLOOD PRESSURE: 118 MMHG

## 2024-06-18 DIAGNOSIS — I10 PRIMARY HYPERTENSION: ICD-10-CM

## 2024-06-18 DIAGNOSIS — I25.10 ASHD (ARTERIOSCLEROTIC HEART DISEASE): Primary | ICD-10-CM

## 2024-06-18 DIAGNOSIS — E78.49 OTHER HYPERLIPIDEMIA: ICD-10-CM

## 2024-06-18 PROCEDURE — 3078F DIAST BP <80 MM HG: CPT | Performed by: INTERNAL MEDICINE

## 2024-06-18 PROCEDURE — 99214 OFFICE O/P EST MOD 30 MIN: CPT | Performed by: INTERNAL MEDICINE

## 2024-06-18 PROCEDURE — 3074F SYST BP LT 130 MM HG: CPT | Performed by: INTERNAL MEDICINE

## 2024-06-18 PROCEDURE — 4010F ACE/ARB THERAPY RXD/TAKEN: CPT | Performed by: INTERNAL MEDICINE

## 2024-06-18 PROCEDURE — 4004F PT TOBACCO SCREEN RCVD TLK: CPT | Performed by: INTERNAL MEDICINE

## 2024-06-18 ASSESSMENT — ENCOUNTER SYMPTOMS
IRREGULAR HEARTBEAT: 0
MYALGIAS: 0
CLAUDICATION: 0
DEPRESSION: 0
WEIGHT LOSS: 0
COUGH: 0
DIZZINESS: 0
FEVER: 0
ORTHOPNEA: 0
DYSPNEA ON EXERTION: 0
NEAR-SYNCOPE: 0
LOSS OF SENSATION IN FEET: 1
WHEEZING: 0
PND: 0
DIAPHORESIS: 0
SHORTNESS OF BREATH: 0
WEIGHT GAIN: 0
WEAKNESS: 0
OCCASIONAL FEELINGS OF UNSTEADINESS: 0
SYNCOPE: 0
PALPITATIONS: 0

## 2024-06-18 ASSESSMENT — PATIENT HEALTH QUESTIONNAIRE - PHQ9
SUM OF ALL RESPONSES TO PHQ9 QUESTIONS 1 AND 2: 0
2. FEELING DOWN, DEPRESSED OR HOPELESS: NOT AT ALL
1. LITTLE INTEREST OR PLEASURE IN DOING THINGS: NOT AT ALL

## 2024-06-18 ASSESSMENT — PAIN SCALES - GENERAL: PAINLEVEL: 0-NO PAIN

## 2024-06-18 NOTE — PROGRESS NOTES
Subjective      Chief Complaint   Patient presents with    Follow-up     6 month follow up          58-year-old male with a history of hypertension presents to [facility] on March 5, 2022 with chest pain and was found with an anterior ST-elevation myocardial infarction. He underwent emergent cardiac catheterization where a 100% mid LAD occlusion was found and addressed ultimately with a 3.0x24 synergy everolimus eluting stent that was aggressively post dilated to 3.0 in its entirety, 3.5 for the proximal 1/2 of the stent. He was monitored in the ICU without any significant issues. His echocardiogram showed overall an ejection fraction of 50-54%. He walks for exercise without limitations         Review of Systems   Constitutional: Negative for diaphoresis, fever, weight gain and weight loss.   Eyes:  Negative for visual disturbance.   Cardiovascular:  Negative for chest pain, claudication, dyspnea on exertion, irregular heartbeat, leg swelling, near-syncope, orthopnea, palpitations, paroxysmal nocturnal dyspnea and syncope.   Respiratory:  Negative for cough, shortness of breath and wheezing.    Musculoskeletal:  Negative for muscle weakness and myalgias.   Neurological:  Negative for dizziness and weakness.   All other systems reviewed and are negative.       Past Medical History:   Diagnosis Date    Acute ST elevation myocardial infarction (STEMI) (Multi) 09/03/2023    ASHD (arteriosclerotic heart disease)     Hyperlipidemia     Hypertension     Personal history of other diseases of the respiratory system 12/08/2021    History of acute sinusitis        No past surgical history on file.     Social History     Socioeconomic History    Marital status:      Spouse name: Not on file    Number of children: Not on file    Years of education: Not on file    Highest education level: Not on file   Occupational History    Not on file   Tobacco Use    Smoking status: Every Day     Current  packs/day: 0.25     Types: Cigarettes    Smokeless tobacco: Never   Vaping Use    Vaping status: Never Used   Substance and Sexual Activity    Alcohol use: Not Currently    Drug use: Never    Sexual activity: Defer   Other Topics Concern    Not on file   Social History Narrative    Not on file     Social Determinants of Health     Financial Resource Strain: Not on file   Food Insecurity: Not on file   Transportation Needs: Not on file   Physical Activity: Not on file   Stress: Not on file   Social Connections: Not on file   Intimate Partner Violence: Not on file   Housing Stability: Not on file        Family History   Problem Relation Name Age of Onset    COPD Mother      Heart disease Mother      Heart disease Father      Hypertension Father      Other (drug overdose) Brother          OBJECTIVE:    Vitals:    06/18/24 0957   BP: 118/78   Pulse: 67   SpO2: 96%        Vitals reviewed.   Constitutional:       Appearance: Normal and healthy appearance. Not in distress.   Pulmonary:      Effort: Pulmonary effort is normal.      Breath sounds: Normal breath sounds.   Cardiovascular:      Normal rate. Regular rhythm. Normal S1. Normal S2.       Murmurs: There is no murmur.      No gallop.  No click.   Pulses:     Intact distal pulses.   Edema:     Peripheral edema absent.   Skin:     General: Skin is warm and dry.   Neurological:      General: No focal deficit present.          Lab Review:   Lab Results   Component Value Date     (L) 06/15/2023    K 3.9 06/15/2023     06/15/2023    CO2 26 06/15/2023    BUN 16 06/15/2023    CREATININE 1.00 06/15/2023    GLUCOSE 259 (H) 06/15/2023    CALCIUM 9.5 06/15/2023     Lab Results   Component Value Date    CHOL 114 06/15/2023    TRIG 152 (H) 06/15/2023    HDL 41.6 06/15/2023       Lab Results   Component Value Date    LDLCALC 49 (L) 06/08/2022        ASHD (arteriosclerotic heart disease)  Stable without angina. Lipids checked 1yr ago LDL was below goal. Has upcoming with  PCP. Continue statin and asa    HLD (hyperlipidemia)  Lipids reviewed with him. LDL was at goal    Hypertension  Controlled. Continue lisinopril. Will stop carvedilol, can uptitrate lisinopril if necessary for additional BP control.

## 2024-06-18 NOTE — ASSESSMENT & PLAN NOTE
Controlled. Continue lisinopril. Will stop carvedilol, can uptitrate lisinopril if necessary for additional BP control.

## 2024-06-18 NOTE — ASSESSMENT & PLAN NOTE
Stable without angina. Lipids checked 1yr ago LDL was below goal. Has upcoming with PCP. Continue statin and asa

## 2024-07-01 ENCOUNTER — PHARMACY VISIT (OUTPATIENT)
Dept: PHARMACY | Facility: CLINIC | Age: 59
End: 2024-07-01

## 2024-07-01 PROCEDURE — RXMED WILLOW AMBULATORY MEDICATION CHARGE

## 2024-07-22 ENCOUNTER — APPOINTMENT (OUTPATIENT)
Dept: PRIMARY CARE | Facility: CLINIC | Age: 59
End: 2024-07-22
Payer: COMMERCIAL

## 2024-07-24 ENCOUNTER — APPOINTMENT (OUTPATIENT)
Dept: PRIMARY CARE | Facility: CLINIC | Age: 59
End: 2024-07-24
Payer: COMMERCIAL

## 2024-08-07 ENCOUNTER — APPOINTMENT (OUTPATIENT)
Dept: PRIMARY CARE | Facility: CLINIC | Age: 59
End: 2024-08-07
Payer: COMMERCIAL

## 2024-08-08 DIAGNOSIS — F41.9 ANXIETY: ICD-10-CM

## 2024-08-08 RX ORDER — ESCITALOPRAM OXALATE 10 MG/1
10 TABLET ORAL DAILY
Qty: 30 TABLET | Refills: 5 | Status: SHIPPED | OUTPATIENT
Start: 2024-08-08

## 2024-08-12 ENCOUNTER — APPOINTMENT (OUTPATIENT)
Dept: PRIMARY CARE | Facility: CLINIC | Age: 59
End: 2024-08-12
Payer: COMMERCIAL

## 2024-08-12 VITALS
BODY MASS INDEX: 33.62 KG/M2 | SYSTOLIC BLOOD PRESSURE: 148 MMHG | WEIGHT: 262 LBS | OXYGEN SATURATION: 95 % | HEIGHT: 74 IN | HEART RATE: 79 BPM | DIASTOLIC BLOOD PRESSURE: 87 MMHG

## 2024-08-12 DIAGNOSIS — I10 PRIMARY HYPERTENSION: ICD-10-CM

## 2024-08-12 DIAGNOSIS — E11.9 TYPE 2 DIABETES MELLITUS WITHOUT COMPLICATION, WITHOUT LONG-TERM CURRENT USE OF INSULIN (MULTI): ICD-10-CM

## 2024-08-12 LAB — POC HEMOGLOBIN A1C: 9.5 % (ref 4.2–6.5)

## 2024-08-12 PROCEDURE — 3008F BODY MASS INDEX DOCD: CPT | Performed by: NURSE PRACTITIONER

## 2024-08-12 PROCEDURE — 3079F DIAST BP 80-89 MM HG: CPT | Performed by: NURSE PRACTITIONER

## 2024-08-12 PROCEDURE — 99214 OFFICE O/P EST MOD 30 MIN: CPT | Performed by: NURSE PRACTITIONER

## 2024-08-12 PROCEDURE — 3077F SYST BP >= 140 MM HG: CPT | Performed by: NURSE PRACTITIONER

## 2024-08-12 PROCEDURE — 83036 HEMOGLOBIN GLYCOSYLATED A1C: CPT | Performed by: NURSE PRACTITIONER

## 2024-08-12 PROCEDURE — 4010F ACE/ARB THERAPY RXD/TAKEN: CPT | Performed by: NURSE PRACTITIONER

## 2024-08-12 RX ORDER — DAPAGLIFLOZIN 5 MG/1
5 TABLET, FILM COATED ORAL DAILY
Qty: 30 TABLET | Refills: 5 | Status: SHIPPED | OUTPATIENT
Start: 2024-08-12

## 2024-08-12 RX ORDER — LISINOPRIL 20 MG/1
20 TABLET ORAL DAILY
Qty: 30 TABLET | Refills: 5 | Status: SHIPPED | OUTPATIENT
Start: 2024-08-12

## 2024-08-12 RX ORDER — ASPIRIN 81 MG/1
81 TABLET ORAL DAILY
COMMUNITY
End: 2024-08-12

## 2024-08-12 NOTE — PATIENT INSTRUCTIONS
Monitor your blood sugar daily  [employer] testing supplies at [facility]- use the discount card.   See me in 1 month  Low fat healthy carb diet  The Plate Method  Metamucil daily

## 2024-08-12 NOTE — PROGRESS NOTES
"Subjective   Patient ID: Julian Nash is a 58 y.o. male who presents for Follow-up (Patient is here today for a follow up. Patient is here today to discuss his diabetes. ).    58-year-old male here for follow-up type 2 diabetes.  He does not monitor blood sugars at home.    DM2- A1C 9.5%.  In office today.  He is on metformin 1000 mg p.o. twice daily.  He was prescribed Jardiance but said it was over $500 a month could not afford that.  In the past he was on Farxiga it worked well but then his insurance stopped covering it.  We talked about diet and lifestyle he is not careful with what he eats.  He does not monitor carbohydrates.  He does not exercise.  He is worried about cost of things due to money and his health insurance is not very good.  I educated on the food plate method and dietary habit change.         Review of Systems    Objective   /87   Pulse 79   Ht 1.88 m (6' 2\")   Wt 119 kg (262 lb)   SpO2 95%   BMI 33.64 kg/m²     Physical Exam  Constitutional:       Appearance: Normal appearance. He is obese.   Cardiovascular:      Rate and Rhythm: Normal rate and regular rhythm.      Pulses: Normal pulses.      Heart sounds: Normal heart sounds.   Pulmonary:      Effort: Pulmonary effort is normal.      Breath sounds: Normal breath sounds.   Musculoskeletal:         General: Normal range of motion.   Neurological:      General: No focal deficit present.      Mental Status: He is alert and oriented to person, place, and time. Mental status is at baseline.   Psychiatric:         Mood and Affect: Mood normal.         Behavior: Behavior normal.         Assessment/Plan   Diagnoses and all orders for this visit:  Type 2 diabetes mellitus without complication, without long-term current use of insulin (Multi)  Comments:  A1c even higher.  Start Farxiga I gave him a discount card.  He will call me if he has a difficult time filling the prescription  Orders:  -     Follow Up In Primary Care - Health " Maintenance  -     POCT glycosylated hemoglobin (Hb A1C) manually resulted  -     dapagliflozin propanediol (Farxiga) 5 mg; Take 1 tablet (5 mg) by mouth once daily.  Primary hypertension  Comments:  Above goal we talked about diet, caffeine, salt and exercise.  Already on lisinopril 20 daily.  Farxiga can help lower blood pressure  Orders:  -     lisinopril 20 mg tablet; Take 1 tablet (20 mg) by mouth once daily.  -     dapagliflozin propanediol (Farxiga) 5 mg; Take 1 tablet (5 mg) by mouth once daily.  Other orders  -     Follow Up In Primary Care - Health Maintenance; Future

## 2024-08-13 ENCOUNTER — TELEPHONE (OUTPATIENT)
Dept: PRIMARY CARE | Facility: CLINIC | Age: 59
End: 2024-08-13
Payer: COMMERCIAL

## 2024-08-13 DIAGNOSIS — E11.69 TYPE 2 DIABETES MELLITUS WITH OTHER SPECIFIED COMPLICATION, WITHOUT LONG-TERM CURRENT USE OF INSULIN (MULTI): Primary | ICD-10-CM

## 2024-09-18 ENCOUNTER — LAB (OUTPATIENT)
Dept: LAB | Facility: LAB | Age: 59
End: 2024-09-18
Payer: COMMERCIAL

## 2024-09-18 ENCOUNTER — APPOINTMENT (OUTPATIENT)
Dept: PHARMACY | Facility: HOSPITAL | Age: 59
End: 2024-09-18
Payer: COMMERCIAL

## 2024-09-18 ENCOUNTER — APPOINTMENT (OUTPATIENT)
Dept: PRIMARY CARE | Facility: CLINIC | Age: 59
End: 2024-09-18
Payer: COMMERCIAL

## 2024-09-18 VITALS
HEART RATE: 78 BPM | SYSTOLIC BLOOD PRESSURE: 130 MMHG | WEIGHT: 261 LBS | DIASTOLIC BLOOD PRESSURE: 83 MMHG | OXYGEN SATURATION: 93 % | HEIGHT: 74 IN | BODY MASS INDEX: 33.5 KG/M2

## 2024-09-18 DIAGNOSIS — E11.69 TYPE 2 DIABETES MELLITUS WITH OTHER SPECIFIED COMPLICATION, WITHOUT LONG-TERM CURRENT USE OF INSULIN: ICD-10-CM

## 2024-09-18 DIAGNOSIS — E78.49 OTHER HYPERLIPIDEMIA: ICD-10-CM

## 2024-09-18 DIAGNOSIS — I10 PRIMARY HYPERTENSION: ICD-10-CM

## 2024-09-18 DIAGNOSIS — Z00.00 WELLNESS EXAMINATION: Primary | ICD-10-CM

## 2024-09-18 DIAGNOSIS — Z00.00 WELLNESS EXAMINATION: ICD-10-CM

## 2024-09-18 LAB
ALBUMIN SERPL BCP-MCNC: 4.2 G/DL (ref 3.4–5)
ALP SERPL-CCNC: 75 U/L (ref 33–120)
ALT SERPL W P-5'-P-CCNC: 21 U/L (ref 10–52)
ANION GAP SERPL CALC-SCNC: 14 MMOL/L (ref 10–20)
AST SERPL W P-5'-P-CCNC: 17 U/L (ref 9–39)
BASOPHILS # BLD AUTO: 0.05 X10*3/UL (ref 0–0.1)
BASOPHILS NFR BLD AUTO: 0.5 %
BILIRUB SERPL-MCNC: 0.7 MG/DL (ref 0–1.2)
BUN SERPL-MCNC: 14 MG/DL (ref 6–23)
CALCIUM SERPL-MCNC: 9.4 MG/DL (ref 8.6–10.3)
CHLORIDE SERPL-SCNC: 101 MMOL/L (ref 98–107)
CHOLEST SERPL-MCNC: 147 MG/DL (ref 0–199)
CHOLESTEROL/HDL RATIO: 4.4
CO2 SERPL-SCNC: 24 MMOL/L (ref 21–32)
CREAT SERPL-MCNC: 0.98 MG/DL (ref 0.5–1.3)
EGFRCR SERPLBLD CKD-EPI 2021: 89 ML/MIN/1.73M*2
EOSINOPHIL # BLD AUTO: 0.15 X10*3/UL (ref 0–0.7)
EOSINOPHIL NFR BLD AUTO: 1.5 %
ERYTHROCYTE [DISTWIDTH] IN BLOOD BY AUTOMATED COUNT: 12.8 % (ref 11.5–14.5)
GLUCOSE SERPL-MCNC: 182 MG/DL (ref 74–99)
HCT VFR BLD AUTO: 49.5 % (ref 41–52)
HDLC SERPL-MCNC: 33.4 MG/DL
HGB BLD-MCNC: 16.4 G/DL (ref 13.5–17.5)
IMM GRANULOCYTES # BLD AUTO: 0.04 X10*3/UL (ref 0–0.7)
IMM GRANULOCYTES NFR BLD AUTO: 0.4 % (ref 0–0.9)
LDLC SERPL CALC-MCNC: 70 MG/DL
LYMPHOCYTES # BLD AUTO: 2.5 X10*3/UL (ref 1.2–4.8)
LYMPHOCYTES NFR BLD AUTO: 24.4 %
MCH RBC QN AUTO: 29.8 PG (ref 26–34)
MCHC RBC AUTO-ENTMCNC: 33.1 G/DL (ref 32–36)
MCV RBC AUTO: 90 FL (ref 80–100)
MONOCYTES # BLD AUTO: 0.72 X10*3/UL (ref 0.1–1)
MONOCYTES NFR BLD AUTO: 7 %
NEUTROPHILS # BLD AUTO: 6.8 X10*3/UL (ref 1.2–7.7)
NEUTROPHILS NFR BLD AUTO: 66.2 %
NON HDL CHOLESTEROL: 114 MG/DL (ref 0–149)
NRBC BLD-RTO: 0 /100 WBCS (ref 0–0)
PLATELET # BLD AUTO: 245 X10*3/UL (ref 150–450)
POTASSIUM SERPL-SCNC: 4.4 MMOL/L (ref 3.5–5.3)
PROT SERPL-MCNC: 6.9 G/DL (ref 6.4–8.2)
RBC # BLD AUTO: 5.5 X10*6/UL (ref 4.5–5.9)
SODIUM SERPL-SCNC: 135 MMOL/L (ref 136–145)
TRIGL SERPL-MCNC: 217 MG/DL (ref 0–149)
VLDL: 43 MG/DL (ref 0–40)
WBC # BLD AUTO: 10.3 X10*3/UL (ref 4.4–11.3)

## 2024-09-18 PROCEDURE — 80061 LIPID PANEL: CPT

## 2024-09-18 PROCEDURE — 3008F BODY MASS INDEX DOCD: CPT | Performed by: NURSE PRACTITIONER

## 2024-09-18 PROCEDURE — 85025 COMPLETE CBC W/AUTO DIFF WBC: CPT

## 2024-09-18 PROCEDURE — 4010F ACE/ARB THERAPY RXD/TAKEN: CPT | Performed by: NURSE PRACTITIONER

## 2024-09-18 PROCEDURE — 36415 COLL VENOUS BLD VENIPUNCTURE: CPT

## 2024-09-18 PROCEDURE — 84154 ASSAY OF PSA FREE: CPT

## 2024-09-18 PROCEDURE — 80053 COMPREHEN METABOLIC PANEL: CPT

## 2024-09-18 PROCEDURE — 82570 ASSAY OF URINE CREATININE: CPT

## 2024-09-18 PROCEDURE — 99396 PREV VISIT EST AGE 40-64: CPT | Performed by: NURSE PRACTITIONER

## 2024-09-18 PROCEDURE — 84153 ASSAY OF PSA TOTAL: CPT

## 2024-09-18 PROCEDURE — 4004F PT TOBACCO SCREEN RCVD TLK: CPT | Performed by: NURSE PRACTITIONER

## 2024-09-18 PROCEDURE — 82043 UR ALBUMIN QUANTITATIVE: CPT

## 2024-09-18 PROCEDURE — 3079F DIAST BP 80-89 MM HG: CPT | Performed by: NURSE PRACTITIONER

## 2024-09-18 PROCEDURE — 3075F SYST BP GE 130 - 139MM HG: CPT | Performed by: NURSE PRACTITIONER

## 2024-09-18 RX ORDER — METFORMIN HYDROCHLORIDE 500 MG/1
1000 TABLET, EXTENDED RELEASE ORAL
Qty: 120 TABLET | Refills: 5 | Status: SHIPPED | OUTPATIENT
Start: 2024-09-18

## 2024-09-18 RX ORDER — LISINOPRIL 10 MG/1
10 TABLET ORAL DAILY
COMMUNITY
End: 2024-09-18 | Stop reason: SDUPTHER

## 2024-09-18 RX ORDER — LISINOPRIL 10 MG/1
10 TABLET ORAL DAILY
Qty: 30 TABLET | Refills: 5 | Status: SHIPPED | OUTPATIENT
Start: 2024-09-18

## 2024-09-18 RX ORDER — ASPIRIN 81 MG/1
81 TABLET ORAL DAILY
COMMUNITY

## 2024-09-18 ASSESSMENT — ENCOUNTER SYMPTOMS: DIABETIC ASSOCIATED SYMPTOMS: 0

## 2024-09-18 NOTE — PROGRESS NOTES
Pharmacist Clinic: Diabetes Management  Julian Nash is a 58 y.o. male was referred to Clinical Pharmacy Team for diabetes management.   Referring Provider: Jacqueline Allen APRN-CNP    Diabetes  He presents for his initial diabetic visit. He has type 2 diabetes mellitus. His disease course has been worsening. There are no diabetic associated symptoms. There are no hypoglycemic complications. There are no diabetic complications. Current diabetic treatment includes oral agent (monotherapy). He is compliant with treatment most of the time. An ACE inhibitor/angiotensin II receptor blocker is being taken.   Referred for cost assistance with Farxiga. Has not taken in awhile due to cost barrier. Insurance has high deductible, contributes zero to copay until met. SGLT2i preferred for CVD and renal benefits.    LAB REVIEW   Lab Results   Component Value Date    LDLCALC 49 (L) 06/08/2022    CREATININE 1.00 06/15/2023      Lab Results   Component Value Date    HGBA1C 9.5 (A) 08/12/2024    HGBA1C 8.7 (A) 01/22/2024    HGBA1C 9.4 (A) 09/18/2023     Lab Results   Component Value Date    TRIG 152 (H) 06/15/2023    TRIG 90 06/08/2022    TRIG 158 (H) 03/06/2022       The ASCVD Risk score (Sea DK, et al., 2019) failed to calculate for the following reasons:    The patient has a prior MI or stroke diagnosis     DIABETES ASSESSMENT  CURRENT PHARMACOTHERAPY  - Metformin XR 1000mg twice daily     Allergies: Patient has no known allergies.     SECONDARY PREVENTION  - Statin? Yes  - ACE-I/ARB? Yes    HISTORICAL PHARMACOTHERAPY  - Farxiga, Jardiance (cost)    [employer] MEASUREMENTS - not testing     AFFORDABILITY/ADHERENCE   - Insurance pays $0 toward med copay until deductible met     Assessment/Plan   Problem List Items Addressed This Visit       Diabetes mellitus (Multi)     Patients diabetes is poorly controlled with most recent A1c of 9.5% (Goal < 7%).   Will apply for  PAP. If PAP denied, patient agreeable for Brenzavvy through [facility] pharmacy for $50/mo.   Patient Assistance Screening (VAF)  Patient verbally reports monthly or yearly income which is less than 400% federal poverty level.  Application for program has been submitted for the following medications: Jardiance    - Pending income document   Patient has been informed that program team will be reaching out to them to discuss necessary documentation, instructed to answer phone/return voicemail.   Patient aware this process may take up to 6 weeks.   If approved medication must be filled through [facility] pharmacy and may be picked up or mailed to patient.    Continue Metformin XR 1000mg twice daily   Education Provided to Patient: Medication MOA, administration, side effects, monitoring     PharmD Follow-up: pending  PAP determination  PCP Follow-up: 12/12/24    Notify your healthcare provider if you have any of the following:  -Diarrhea or vomiting for more than six hours  -Blood sugar >300 mg/dL more than once  -Low blood sugar (<70 mg/dL)  -Questions about your medications    Thank you,  Carmela Puckett, PharmD    Continue all meds under the continuation of care with the referring provider and clinical pharmacy team.  Verbal consent to manage patient's drug therapy was obtained. They were informed they may decline to participate or withdraw from participation in pharmacy services at any time.    Statement Selected

## 2024-09-18 NOTE — PATIENT INSTRUCTIONS
See an eye doc for diab vision check  [facility] in [place]  Get labs fasting 8 hours.

## 2024-09-18 NOTE — PROGRESS NOTES
"Subjective   Patient ID: Julian Nash is a 58 y.o. male who presents for Annual Exam (Patient is here today for a Physical. ).    58 year old male  here for annual CPE  HTN- got dizzy with increased BP meds. On lisinopril 10 mg po every day.   DM2- Farxiga still too expensive did not start. Monitoring the bs in am >200 on MF only. States ins does not cover much  Overdue for labs    Prevention:  Breast Ca screen: no fam HX  Colon Ca Screen: no fam HX, declines screening  Lung Ca Screen: nicotene 1 PPD on and off for years. Started at age 15yr  Prostate Ca Screen: no fam HX  CT Cardiac Score: Personal HX MI 3 years. Managed by cardio Dr Anderson  Diabetes Screen: personal HX DM2 uncontrolled. Mother with DM2. Neuropathy in LT foot but from old injury/crushing 3/2022  Opthal: 2 years ago. Floaters.   Dental: dentures.   Diet: caffeine- rare. ETOH- none  Work FT in factory-steel            Review of Systems    Objective   /83   Pulse 78   Ht 1.88 m (6' 2\")   Wt 118 kg (261 lb)   SpO2 93%   BMI 33.51 kg/m²     Physical Exam  Vitals reviewed.   Constitutional:       General: He is not in acute distress.     Appearance: Normal appearance.   HENT:      Head: Normocephalic and atraumatic.   Eyes:      Extraocular Movements: Extraocular movements intact.      Conjunctiva/sclera: Conjunctivae normal.      Pupils: Pupils are equal, round, and reactive to light.   Neck:      Vascular: No carotid bruit.   Cardiovascular:      Rate and Rhythm: Normal rate and regular rhythm.      Pulses: Normal pulses.      Heart sounds: No murmur heard.  Pulmonary:      Effort: Pulmonary effort is normal.      Breath sounds: Normal breath sounds.   Abdominal:      General: Bowel sounds are normal. There is no distension.      Palpations: Abdomen is soft.      Tenderness: There is no abdominal tenderness.   Musculoskeletal:         General: Normal range of motion.      Cervical back: Normal range of motion and neck supple.   Skin:     " General: Skin is warm and dry.   Neurological:      General: No focal deficit present.      Mental Status: He is alert and oriented to person, place, and time. Mental status is at baseline.   Psychiatric:         Mood and Affect: Mood normal.         Behavior: Behavior normal.         Assessment/Plan   Diagnoses and all orders for this visit:  Wellness examination  Comments:  He declines screening at this time stating it is too costly and insurance will not cover it  Orders:  -     CBC and Auto Differential; Future  -     Comprehensive Metabolic Panel; Future  -     PSA, Total and Free; Future  Primary hypertension  Comments:  Above goal we talked about diet, caffeine, salt and exercise.  Already on lisinopril 10 daily.  Orders:  -     lisinopril 10 mg tablet; Take 1 tablet (10 mg) by mouth once daily.  Type 2 diabetes mellitus with other specified complication, without long-term current use of insulin (Multi)  Comments:  Too early to do another A1c.  Blood sugars are elevated he has an appointment with pharmacy today.  Await their input regarding coverage for medication  Orders:  -     Albumin-Creatinine Ratio, Urine Random; Future  -     metFORMIN  mg 24 hr tablet; Take 2 tablets (1,000 mg) by mouth 2 times daily (morning and late afternoon).  Other hyperlipidemia  Comments:  On statin.  Update lipid panel  Orders:  -     Lipid Panel; Future  Other orders  -     Follow Up In Primary Care - Health Maintenance  -     Follow Up In Primary Care - Established; Future  -     Follow Up In Primary Care - Health Maintenance; Future    I would like to see what the clinical pharmacist finds out for coverage of medication.  He would benefit from the SGLUT-2 class given his medical history of cardiovascular disease hyperlipidemia and diabetes.  If we cannot get this covered or paid for I need to find out what medications his insurance does cover so he can be compliant with the appropriate regimen

## 2024-09-19 LAB
CREAT UR-MCNC: 158.2 MG/DL (ref 20–370)
MICROALBUMIN UR-MCNC: 15 MG/L
MICROALBUMIN/CREAT UR: 9.5 UG/MG CREAT

## 2024-09-20 LAB
PSA FREE MFR SERPL: <33 %
PSA FREE SERPL-MCNC: <0.1 NG/ML
PSA SERPL IA-MCNC: 0.3 NG/ML (ref 0–4)

## 2024-10-29 PROCEDURE — RXMED WILLOW AMBULATORY MEDICATION CHARGE

## 2024-10-31 ENCOUNTER — PHARMACY VISIT (OUTPATIENT)
Dept: PHARMACY | Facility: CLINIC | Age: 59
End: 2024-10-31
Payer: COMMERCIAL

## 2024-12-04 ENCOUNTER — APPOINTMENT (OUTPATIENT)
Age: 59
End: 2024-12-04
Payer: COMMERCIAL

## 2024-12-04 VITALS
OXYGEN SATURATION: 98 % | WEIGHT: 260 LBS | DIASTOLIC BLOOD PRESSURE: 70 MMHG | SYSTOLIC BLOOD PRESSURE: 134 MMHG | HEART RATE: 80 BPM | BODY MASS INDEX: 33.38 KG/M2

## 2024-12-04 DIAGNOSIS — I10 PRIMARY HYPERTENSION: ICD-10-CM

## 2024-12-04 DIAGNOSIS — I25.10 ASHD (ARTERIOSCLEROTIC HEART DISEASE): Primary | ICD-10-CM

## 2024-12-04 DIAGNOSIS — E78.49 OTHER HYPERLIPIDEMIA: ICD-10-CM

## 2024-12-04 PROCEDURE — 99214 OFFICE O/P EST MOD 30 MIN: CPT | Performed by: INTERNAL MEDICINE

## 2024-12-04 PROCEDURE — 4010F ACE/ARB THERAPY RXD/TAKEN: CPT | Performed by: INTERNAL MEDICINE

## 2024-12-04 PROCEDURE — 3048F LDL-C <100 MG/DL: CPT | Performed by: INTERNAL MEDICINE

## 2024-12-04 PROCEDURE — 3061F NEG MICROALBUMINURIA REV: CPT | Performed by: INTERNAL MEDICINE

## 2024-12-04 PROCEDURE — 3078F DIAST BP <80 MM HG: CPT | Performed by: INTERNAL MEDICINE

## 2024-12-04 PROCEDURE — 4004F PT TOBACCO SCREEN RCVD TLK: CPT | Performed by: INTERNAL MEDICINE

## 2024-12-04 PROCEDURE — 3075F SYST BP GE 130 - 139MM HG: CPT | Performed by: INTERNAL MEDICINE

## 2024-12-04 ASSESSMENT — ENCOUNTER SYMPTOMS
ORTHOPNEA: 0
PND: 0
DIZZINESS: 0
FEVER: 0
WEIGHT GAIN: 0
WEAKNESS: 0
MYALGIAS: 0
IRREGULAR HEARTBEAT: 0
DIAPHORESIS: 0
CLAUDICATION: 0
DYSPNEA ON EXERTION: 0
WHEEZING: 0
NEAR-SYNCOPE: 0
SHORTNESS OF BREATH: 0
SYNCOPE: 0
WEIGHT LOSS: 0
PALPITATIONS: 0
COUGH: 0

## 2024-12-04 ASSESSMENT — PAIN SCALES - GENERAL: PAINLEVEL_OUTOF10: 0-NO PAIN

## 2024-12-04 NOTE — PROGRESS NOTES
Subjective      Chief Complaint   Patient presents with    Follow-up        58-year-old male with a history of hypertension presents to [facility] on March 5, 2022 with chest pain and was found with an anterior ST-elevation myocardial infarction.  A 100% mid LAD occlusion was found and addressed ultimately with a 3.0x24 synergy everolimus eluting stent that was aggressively post dilated to 3.0 in its entirety, 3.5 for the proximal 1/2 of the stent. Echocardiogram showed overall an ejection fraction of 50-54%.         Review of Systems   Constitutional: Negative for diaphoresis, fever, weight gain and weight loss.   Eyes:  Negative for visual disturbance.   Cardiovascular:  Negative for chest pain, claudication, dyspnea on exertion, irregular heartbeat, leg swelling, near-syncope, orthopnea, palpitations, paroxysmal nocturnal dyspnea and syncope.   Respiratory:  Negative for cough, shortness of breath and wheezing.    Musculoskeletal:  Negative for muscle weakness and myalgias.   Neurological:  Negative for dizziness and weakness.   All other systems reviewed and are negative.       Past Medical History:   Diagnosis Date    Acute ST elevation myocardial infarction (STEMI) (Multi) 09/03/2023    ASHD (arteriosclerotic heart disease)     Hyperlipidemia     Hypertension     Personal history of other diseases of the respiratory system 12/08/2021    History of acute sinusitis        History reviewed. No pertinent surgical history.     Social History     Socioeconomic History    Marital status:      Spouse name: Not on file    Number of children: Not on file    Years of education: Not on file    Highest education level: Not on file   Occupational History    Not on file   Tobacco Use    Smoking status: Every Day     Current packs/day: 0.25     Types: Cigarettes    Smokeless tobacco: Never   Vaping Use    Vaping status: Never Used   Substance and Sexual Activity    Alcohol use: Not Currently     Drug use: Never    Sexual activity: Defer   Other Topics Concern    Not on file   Social History Narrative    Not on file     Social Drivers of Health     Financial Resource Strain: Not on file   Food Insecurity: Not on file   Transportation Needs: Not on file   Physical Activity: Not on file   Stress: Not on file   Social Connections: Not on file   Intimate Partner Violence: Not on file   Housing Stability: Not on file        Family History   Problem Relation Name Age of Onset    COPD Mother      Heart disease Mother      Heart disease Father      Hypertension Father      Other (drug overdose) Brother          OBJECTIVE:    Vitals:    12/04/24 1303   BP: 134/70   Pulse: 80   SpO2: 98%        Vitals reviewed.   Constitutional:       Appearance: Normal and healthy appearance. Not in distress.   Pulmonary:      Effort: Pulmonary effort is normal.      Breath sounds: Normal breath sounds.   Cardiovascular:      Normal rate. Regular rhythm. Normal S1. Normal S2.       Murmurs: There is no murmur.      No gallop.  No click.   Pulses:     Intact distal pulses.   Edema:     Peripheral edema absent.   Skin:     General: Skin is warm and dry.   Neurological:      General: No focal deficit present.          Lab Review:   Lab Results   Component Value Date     (L) 09/18/2024    K 4.4 09/18/2024     09/18/2024    CO2 24 09/18/2024    BUN 14 09/18/2024    CREATININE 0.98 09/18/2024    GLUCOSE 182 (H) 09/18/2024    CALCIUM 9.4 09/18/2024     Lab Results   Component Value Date    CHOL 147 09/18/2024    TRIG 217 (H) 09/18/2024    HDL 33.4 09/18/2024       Lab Results   Component Value Date    LDLCALC 70 09/18/2024        ASHD (arteriosclerotic heart disease)  Stable without angina, continue statin and asa. Lipids from Sept 2024 reviewed LDL is at goal    Hypertension  Controlled, continue lisinopril    HLD (hyperlipidemia)  Lipids reviewed with him, goal LDL<70. Continue statin. Triglycerides are elevated, discussed  lifestyle mod.

## 2024-12-04 NOTE — ASSESSMENT & PLAN NOTE
Lipids reviewed with him, goal LDL<70. Continue statin. Triglycerides are elevated, discussed lifestyle mod.

## 2024-12-05 PROCEDURE — RXMED WILLOW AMBULATORY MEDICATION CHARGE

## 2024-12-06 ENCOUNTER — PHARMACY VISIT (OUTPATIENT)
Dept: PHARMACY | Facility: CLINIC | Age: 59
End: 2024-12-06
Payer: COMMERCIAL

## 2024-12-11 ENCOUNTER — PHARMACY VISIT (OUTPATIENT)
Dept: PHARMACY | Facility: CLINIC | Age: 59
End: 2024-12-11
Payer: COMMERCIAL

## 2024-12-11 PROCEDURE — RXMED WILLOW AMBULATORY MEDICATION CHARGE

## 2024-12-12 ENCOUNTER — APPOINTMENT (OUTPATIENT)
Dept: PRIMARY CARE | Facility: CLINIC | Age: 59
End: 2024-12-12
Payer: COMMERCIAL

## 2024-12-12 VITALS
DIASTOLIC BLOOD PRESSURE: 86 MMHG | HEIGHT: 74 IN | WEIGHT: 260 LBS | HEART RATE: 82 BPM | BODY MASS INDEX: 33.37 KG/M2 | OXYGEN SATURATION: 96 % | SYSTOLIC BLOOD PRESSURE: 149 MMHG

## 2024-12-12 DIAGNOSIS — E11.69 TYPE 2 DIABETES MELLITUS WITH OTHER SPECIFIED COMPLICATION, WITHOUT LONG-TERM CURRENT USE OF INSULIN: ICD-10-CM

## 2024-12-12 LAB — POC HEMOGLOBIN A1C: 7.8 % (ref 4.2–6.5)

## 2024-12-12 PROCEDURE — 3077F SYST BP >= 140 MM HG: CPT | Performed by: NURSE PRACTITIONER

## 2024-12-12 PROCEDURE — 4010F ACE/ARB THERAPY RXD/TAKEN: CPT | Performed by: NURSE PRACTITIONER

## 2024-12-12 PROCEDURE — 3008F BODY MASS INDEX DOCD: CPT | Performed by: NURSE PRACTITIONER

## 2024-12-12 PROCEDURE — 3061F NEG MICROALBUMINURIA REV: CPT | Performed by: NURSE PRACTITIONER

## 2024-12-12 PROCEDURE — 83036 HEMOGLOBIN GLYCOSYLATED A1C: CPT | Performed by: NURSE PRACTITIONER

## 2024-12-12 PROCEDURE — 99213 OFFICE O/P EST LOW 20 MIN: CPT | Performed by: NURSE PRACTITIONER

## 2024-12-12 PROCEDURE — 4004F PT TOBACCO SCREEN RCVD TLK: CPT | Performed by: NURSE PRACTITIONER

## 2024-12-12 PROCEDURE — 3048F LDL-C <100 MG/DL: CPT | Performed by: NURSE PRACTITIONER

## 2024-12-12 PROCEDURE — 3079F DIAST BP 80-89 MM HG: CPT | Performed by: NURSE PRACTITIONER

## 2024-12-12 NOTE — PROGRESS NOTES
"Subjective   Patient ID: Julian Nash is a 59 y.o. male who presents for Follow-up (Patient is here today for a follow up on his A1C).    59-year-old male here for follow DM2 was started  on Farxiga approx 6 weeks. His A1C is 7.8%. much improvement.  He spoke with our pharmacist to help get the Farxiga covered through his insurance company  Occasionally monitoring BS. Staying <200.   No change in vision.  Denies any side effects or rashes.  I educated on  Possible side effect of urologic rashes and urinary tract infections  Having multiple loose stools a day this is not new this has been ongoing he is on metformin 1000 mg p.o. twice daily           Review of Systems    Objective   /86   Pulse 82   Ht 1.88 m (6' 2\")   Wt 118 kg (260 lb)   SpO2 96%   BMI 33.38 kg/m²     Physical Exam  Constitutional:       Appearance: Normal appearance. He is obese.   Pulmonary:      Effort: Pulmonary effort is normal.   Musculoskeletal:         General: Normal range of motion.   Neurological:      General: No focal deficit present.      Mental Status: He is alert and oriented to person, place, and time. Mental status is at baseline.   Psychiatric:         Mood and Affect: Mood normal.         Behavior: Behavior normal.         Assessment/Plan   Diagnoses and all orders for this visit:  Type 2 diabetes mellitus with other specified complication, without long-term current use of insulin  Comments:  Much better control with the addition of Farxiga.  Will decrease the metformin to 1000 a.m. 500 in the evening to see if I can decrease the diarrhea  Orders:  -     POCT glycosylated hemoglobin (Hb A1C) manually resulted  Other orders  -     Follow Up In Primary Care - Established  -     Follow Up In Primary Care - Established; Future       See me 3 months with labs  "

## 2024-12-31 PROCEDURE — RXMED WILLOW AMBULATORY MEDICATION CHARGE

## 2025-01-02 ENCOUNTER — PHARMACY VISIT (OUTPATIENT)
Dept: PHARMACY | Facility: CLINIC | Age: 60
End: 2025-01-02
Payer: COMMERCIAL

## 2025-01-28 PROCEDURE — RXMED WILLOW AMBULATORY MEDICATION CHARGE

## 2025-01-30 ENCOUNTER — PHARMACY VISIT (OUTPATIENT)
Dept: PHARMACY | Facility: CLINIC | Age: 60
End: 2025-01-30
Payer: COMMERCIAL

## 2025-02-27 PROCEDURE — RXMED WILLOW AMBULATORY MEDICATION CHARGE

## 2025-02-28 ENCOUNTER — PHARMACY VISIT (OUTPATIENT)
Dept: PHARMACY | Facility: CLINIC | Age: 60
End: 2025-02-28
Payer: COMMERCIAL

## 2025-03-04 DIAGNOSIS — I25.10 ARTERIOSCLEROSIS OF CORONARY ARTERY: ICD-10-CM

## 2025-03-04 RX ORDER — ROSUVASTATIN CALCIUM 40 MG/1
TABLET, COATED ORAL
Qty: 90 TABLET | Refills: 4 | Status: CANCELLED | OUTPATIENT
Start: 2025-03-04 | End: 2026-03-04

## 2025-03-05 RX ORDER — ROSUVASTATIN CALCIUM 40 MG/1
TABLET, COATED ORAL
Qty: 90 TABLET | Refills: 4 | Status: SHIPPED | OUTPATIENT
Start: 2025-03-05 | End: 2026-03-05

## 2025-03-07 PROCEDURE — RXMED WILLOW AMBULATORY MEDICATION CHARGE

## 2025-03-12 ENCOUNTER — PHARMACY VISIT (OUTPATIENT)
Dept: PHARMACY | Facility: CLINIC | Age: 60
End: 2025-03-12
Payer: COMMERCIAL

## 2025-03-17 ENCOUNTER — APPOINTMENT (OUTPATIENT)
Dept: PRIMARY CARE | Facility: CLINIC | Age: 60
End: 2025-03-17
Payer: COMMERCIAL

## 2025-03-26 PROCEDURE — RXMED WILLOW AMBULATORY MEDICATION CHARGE

## 2025-03-27 ENCOUNTER — APPOINTMENT (OUTPATIENT)
Dept: PRIMARY CARE | Facility: CLINIC | Age: 60
End: 2025-03-27
Payer: COMMERCIAL

## 2025-03-27 VITALS
OXYGEN SATURATION: 96 % | SYSTOLIC BLOOD PRESSURE: 149 MMHG | WEIGHT: 262 LBS | HEIGHT: 74 IN | DIASTOLIC BLOOD PRESSURE: 89 MMHG | HEART RATE: 77 BPM | BODY MASS INDEX: 33.62 KG/M2

## 2025-03-27 DIAGNOSIS — E11.9 TYPE 2 DIABETES MELLITUS WITHOUT COMPLICATION, WITHOUT LONG-TERM CURRENT USE OF INSULIN: ICD-10-CM

## 2025-03-27 DIAGNOSIS — E11.69 TYPE 2 DIABETES MELLITUS WITH OTHER SPECIFIED COMPLICATION, WITHOUT LONG-TERM CURRENT USE OF INSULIN: ICD-10-CM

## 2025-03-27 DIAGNOSIS — I10 PRIMARY HYPERTENSION: ICD-10-CM

## 2025-03-27 DIAGNOSIS — F41.9 ANXIETY: ICD-10-CM

## 2025-03-27 LAB — POC HEMOGLOBIN A1C: 10.1 % (ref 4.2–6.5)

## 2025-03-27 PROCEDURE — 4010F ACE/ARB THERAPY RXD/TAKEN: CPT | Performed by: NURSE PRACTITIONER

## 2025-03-27 PROCEDURE — 99214 OFFICE O/P EST MOD 30 MIN: CPT | Performed by: NURSE PRACTITIONER

## 2025-03-27 PROCEDURE — 83036 HEMOGLOBIN GLYCOSYLATED A1C: CPT | Performed by: NURSE PRACTITIONER

## 2025-03-27 PROCEDURE — 3079F DIAST BP 80-89 MM HG: CPT | Performed by: NURSE PRACTITIONER

## 2025-03-27 PROCEDURE — 3008F BODY MASS INDEX DOCD: CPT | Performed by: NURSE PRACTITIONER

## 2025-03-27 PROCEDURE — 3077F SYST BP >= 140 MM HG: CPT | Performed by: NURSE PRACTITIONER

## 2025-03-27 RX ORDER — DAPAGLIFLOZIN 5 MG/1
5 TABLET, FILM COATED ORAL DAILY
Qty: 30 TABLET | Refills: 5 | Status: SHIPPED | OUTPATIENT
Start: 2025-03-27

## 2025-03-27 RX ORDER — ESCITALOPRAM OXALATE 10 MG/1
10 TABLET ORAL DAILY
Qty: 30 TABLET | Refills: 5 | Status: SHIPPED | OUTPATIENT
Start: 2025-03-27

## 2025-03-27 RX ORDER — METFORMIN HYDROCHLORIDE 500 MG/1
1000 TABLET, EXTENDED RELEASE ORAL
Qty: 120 TABLET | Refills: 5 | Status: SHIPPED | OUTPATIENT
Start: 2025-03-27

## 2025-03-27 RX ORDER — LISINOPRIL 10 MG/1
10 TABLET ORAL DAILY
Qty: 30 TABLET | Refills: 5 | Status: SHIPPED | OUTPATIENT
Start: 2025-03-27

## 2025-03-27 NOTE — PROGRESS NOTES
"Subjective   Patient ID: Julian Nash is a 59 y.o. male who presents for Follow-up (Patient is here today for a 3 month follow up. Patient wants to mention that he has 2 lumps that he has found that are not painful.).    59 year old male here for 3 mo follow up  DM2- A1C 10.1 in office. Has been eating a lot of sugar, neptali milk. Does not remember the meds at night. 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   Wanted me to look at 2 skin tags- RT cheek; LT upper back. No itching         Review of Systems    Objective   /89   Pulse 77   Ht 1.88 m (6' 2\")   Wt 119 kg (262 lb)   SpO2 96%   BMI 33.64 kg/m²     Physical Exam  Constitutional:       Appearance: Normal appearance. He is obese.   Cardiovascular:      Rate and Rhythm: Normal rate and regular rhythm.      Pulses: Normal pulses.      Heart sounds: Normal heart sounds.   Pulmonary:      Effort: Pulmonary effort is normal.      Breath sounds: Normal breath sounds.   Musculoskeletal:         General: Normal range of motion.   Skin:     Comments: RT upper cheek with very small pin point white/skin color skin tag; LT upper back  small thickened mole, rounded, smooth border, no erythema, non tender, no irritation or itching   Neurological:      General: No focal deficit present.      Mental Status: He is alert and oriented to person, place, and time. Mental status is at baseline.   Psychiatric:         Mood and Affect: Mood normal.         Behavior: Behavior normal.         Assessment/Plan   Diagnoses and all orders for this visit:  Type 2 diabetes mellitus with other specified complication, without long-term current use of insulin  -     POCT glycosylated hemoglobin (Hb A1C) manually resulted  -     metFORMIN  mg 24 hr tablet; Take 2 tablets (1,000 mg) by mouth 2 times daily (morning and late afternoon).  Type 2 diabetes mellitus with other specified complication, without long-term current use of " insulin  Comments:  uncontrolled. get back on track with diet  Orders:  -     POCT glycosylated hemoglobin (Hb A1C) manually resulted  -     metFORMIN  mg 24 hr tablet; Take 2 tablets (1,000 mg) by mouth 2 times daily (morning and late afternoon).  Primary hypertension  Comments:  better on the ACE-I  Orders:  -     lisinopril 10 mg tablet; Take 1 tablet (10 mg) by mouth once daily.  Anxiety  Comments:  Well-controlled on Lexapro  Orders:  -     escitalopram (Lexapro) 10 mg tablet; Take 1 tablet (10 mg) by mouth once daily.  Type 2 diabetes mellitus without complication, without long-term current use of insulin (Multi)  -     dapagliflozin propanediol (Farxiga) 5 mg; Take 1 tablet (5 mg) by mouth once daily.  Other orders  -     Follow Up In Primary Care - Established  -     Follow Up In Primary Care - Established; Future  -     Follow Up In Primary Care - Health Maintenance; Future

## 2025-03-28 ENCOUNTER — PHARMACY VISIT (OUTPATIENT)
Dept: PHARMACY | Facility: CLINIC | Age: 60
End: 2025-03-28
Payer: COMMERCIAL

## 2025-04-04 PROCEDURE — RXMED WILLOW AMBULATORY MEDICATION CHARGE

## 2025-04-09 ENCOUNTER — PHARMACY VISIT (OUTPATIENT)
Dept: PHARMACY | Facility: CLINIC | Age: 60
End: 2025-04-09
Payer: COMMERCIAL

## 2025-04-28 PROCEDURE — RXMED WILLOW AMBULATORY MEDICATION CHARGE

## 2025-05-01 ENCOUNTER — PHARMACY VISIT (OUTPATIENT)
Dept: PHARMACY | Facility: CLINIC | Age: 60
End: 2025-05-01
Payer: COMMERCIAL

## 2025-05-05 PROCEDURE — RXMED WILLOW AMBULATORY MEDICATION CHARGE

## 2025-05-08 ENCOUNTER — PHARMACY VISIT (OUTPATIENT)
Dept: PHARMACY | Facility: CLINIC | Age: 60
End: 2025-05-08
Payer: COMMERCIAL

## 2025-06-03 PROCEDURE — RXMED WILLOW AMBULATORY MEDICATION CHARGE

## 2025-06-10 ENCOUNTER — PHARMACY VISIT (OUTPATIENT)
Dept: PHARMACY | Facility: CLINIC | Age: 60
End: 2025-06-10
Payer: COMMERCIAL

## 2025-07-01 ENCOUNTER — APPOINTMENT (OUTPATIENT)
Dept: PRIMARY CARE | Facility: CLINIC | Age: 60
End: 2025-07-01
Payer: COMMERCIAL

## 2025-07-03 PROCEDURE — RXMED WILLOW AMBULATORY MEDICATION CHARGE

## 2025-07-10 ENCOUNTER — PHARMACY VISIT (OUTPATIENT)
Dept: PHARMACY | Facility: CLINIC | Age: 60
End: 2025-07-10
Payer: COMMERCIAL

## 2025-07-22 ENCOUNTER — APPOINTMENT (OUTPATIENT)
Dept: PRIMARY CARE | Facility: CLINIC | Age: 60
End: 2025-07-22
Payer: COMMERCIAL

## 2025-07-22 VITALS
DIASTOLIC BLOOD PRESSURE: 84 MMHG | OXYGEN SATURATION: 95 % | SYSTOLIC BLOOD PRESSURE: 142 MMHG | HEIGHT: 74 IN | HEART RATE: 80 BPM | BODY MASS INDEX: 32.47 KG/M2 | WEIGHT: 253 LBS

## 2025-07-22 DIAGNOSIS — F41.9 ANXIETY: ICD-10-CM

## 2025-07-22 DIAGNOSIS — E11.9 TYPE 2 DIABETES MELLITUS WITHOUT COMPLICATION, WITHOUT LONG-TERM CURRENT USE OF INSULIN: ICD-10-CM

## 2025-07-22 DIAGNOSIS — I10 PRIMARY HYPERTENSION: ICD-10-CM

## 2025-07-22 DIAGNOSIS — E78.49 OTHER HYPERLIPIDEMIA: ICD-10-CM

## 2025-07-22 DIAGNOSIS — E11.69 TYPE 2 DIABETES MELLITUS WITH OTHER SPECIFIED COMPLICATION, WITHOUT LONG-TERM CURRENT USE OF INSULIN: ICD-10-CM

## 2025-07-22 DIAGNOSIS — E11.69 TYPE 2 DIABETES MELLITUS WITH OTHER SPECIFIED COMPLICATION, WITHOUT LONG-TERM CURRENT USE OF INSULIN: Primary | ICD-10-CM

## 2025-07-22 LAB — POC HEMOGLOBIN A1C: 8.2 % (ref 4.2–6.5)

## 2025-07-22 PROCEDURE — 3052F HG A1C>EQUAL 8.0%<EQUAL 9.0%: CPT | Performed by: NURSE PRACTITIONER

## 2025-07-22 PROCEDURE — 4010F ACE/ARB THERAPY RXD/TAKEN: CPT | Performed by: NURSE PRACTITIONER

## 2025-07-22 PROCEDURE — 3077F SYST BP >= 140 MM HG: CPT | Performed by: NURSE PRACTITIONER

## 2025-07-22 PROCEDURE — RXMED WILLOW AMBULATORY MEDICATION CHARGE

## 2025-07-22 PROCEDURE — 99213 OFFICE O/P EST LOW 20 MIN: CPT | Performed by: NURSE PRACTITIONER

## 2025-07-22 PROCEDURE — 3008F BODY MASS INDEX DOCD: CPT | Performed by: NURSE PRACTITIONER

## 2025-07-22 PROCEDURE — 3079F DIAST BP 80-89 MM HG: CPT | Performed by: NURSE PRACTITIONER

## 2025-07-22 PROCEDURE — 83036 HEMOGLOBIN GLYCOSYLATED A1C: CPT | Performed by: NURSE PRACTITIONER

## 2025-07-22 RX ORDER — DAPAGLIFLOZIN 5 MG/1
5 TABLET, FILM COATED ORAL DAILY
Qty: 30 TABLET | Refills: 5 | Status: SHIPPED | OUTPATIENT
Start: 2025-07-22 | End: 2025-07-22 | Stop reason: SDUPTHER

## 2025-07-22 RX ORDER — LISINOPRIL 10 MG/1
10 TABLET ORAL DAILY
Qty: 30 TABLET | Refills: 5 | Status: SHIPPED | OUTPATIENT
Start: 2025-07-22

## 2025-07-22 RX ORDER — METFORMIN HYDROCHLORIDE 500 MG/1
1000 TABLET, EXTENDED RELEASE ORAL
Qty: 120 TABLET | Refills: 5 | Status: SHIPPED | OUTPATIENT
Start: 2025-07-22

## 2025-07-22 RX ORDER — ESCITALOPRAM OXALATE 10 MG/1
10 TABLET ORAL DAILY
Qty: 30 TABLET | Refills: 5 | Status: SHIPPED | OUTPATIENT
Start: 2025-07-22

## 2025-07-22 RX ORDER — DAPAGLIFLOZIN 5 MG/1
5 TABLET, FILM COATED ORAL DAILY
Qty: 30 TABLET | Refills: 5 | Status: SHIPPED | OUTPATIENT
Start: 2025-07-22

## 2025-07-22 ASSESSMENT — ENCOUNTER SYMPTOMS
DEPRESSION: 1
OCCASIONAL FEELINGS OF UNSTEADINESS: 0
LOSS OF SENSATION IN FEET: 0

## 2025-07-22 NOTE — PROGRESS NOTES
"Subjective   Patient ID: Julian Nash is a 59 y.o. male who presents for Follow-up (Patient is here today for a 3 month follow up. ).    59 year old male here for routine follow up  DM2- losing weight. Changed diet. A1C 8.2% down from 10.1%. Still on Farxiga and MF.   Labs reviewed.   No acute concerns  Meds reviewed due for refills  Occasional leg cramps- takes salt and resolves  Sees cardio- Dr Anderson- annually         Review of Systems    Objective   /84   Pulse 80   Ht 1.88 m (6' 2\")   Wt 115 kg (253 lb)   SpO2 95%   BMI 32.48 kg/m²     Physical Exam  Constitutional:       Appearance: Normal appearance. He is obese.     Cardiovascular:      Rate and Rhythm: Normal rate and regular rhythm.      Pulses: Normal pulses.      Heart sounds: Normal heart sounds.   Pulmonary:      Effort: Pulmonary effort is normal.      Breath sounds: Normal breath sounds.     Neurological:      General: No focal deficit present.      Mental Status: He is alert and oriented to person, place, and time. Mental status is at baseline.     Psychiatric:         Mood and Affect: Mood normal.         Behavior: Behavior normal.         Thought Content: Thought content normal.         Assessment/Plan   Diagnoses and all orders for this visit:  Type 2 diabetes mellitus with other specified complication, without long-term current use of insulin  Comments:  much better. no change in meds. cont on the healthy diet  Orders:  -     POCT glycosylated hemoglobin (Hb A1C) manually resulted  -     metFORMIN  mg 24 hr tablet; Take 2 tablets (1,000 mg) by mouth 2 times daily (morning and late afternoon).  Type 2 diabetes mellitus with other specified complication, without long-term current use of insulin  -     POCT glycosylated hemoglobin (Hb A1C) manually resulted  -     metFORMIN  mg 24 hr tablet; Take 2 tablets (1,000 mg) by mouth 2 times daily (morning and late afternoon).  Primary hypertension  Comments:  better on the " ACE-I  Orders:  -     lisinopril 10 mg tablet; Take 1 tablet (10 mg) by mouth once daily.  Anxiety  Comments:  Well-controlled on Lexapro  Orders:  -     escitalopram (Lexapro) 10 mg tablet; Take 1 tablet (10 mg) by mouth once daily.  Other hyperlipidemia  Comments:  on statin. can use CoQ10 for the leg cramps  Other orders  -     Follow Up In Primary Care - Established

## 2025-07-23 ENCOUNTER — PHARMACY VISIT (OUTPATIENT)
Dept: PHARMACY | Facility: CLINIC | Age: 60
End: 2025-07-23
Payer: COMMERCIAL

## 2025-08-02 PROCEDURE — RXMED WILLOW AMBULATORY MEDICATION CHARGE

## 2025-08-11 ENCOUNTER — PHARMACY VISIT (OUTPATIENT)
Dept: PHARMACY | Facility: CLINIC | Age: 60
End: 2025-08-11
Payer: COMMERCIAL

## 2025-08-29 PROCEDURE — RXMED WILLOW AMBULATORY MEDICATION CHARGE

## 2025-09-02 ENCOUNTER — PHARMACY VISIT (OUTPATIENT)
Dept: PHARMACY | Facility: CLINIC | Age: 60
End: 2025-09-02
Payer: COMMERCIAL

## 2025-09-22 ENCOUNTER — APPOINTMENT (OUTPATIENT)
Dept: PRIMARY CARE | Facility: CLINIC | Age: 60
End: 2025-09-22
Payer: COMMERCIAL

## 2025-10-28 ENCOUNTER — APPOINTMENT (OUTPATIENT)
Dept: PRIMARY CARE | Facility: CLINIC | Age: 60
End: 2025-10-28
Payer: COMMERCIAL